# Patient Record
Sex: FEMALE | Employment: UNEMPLOYED | URBAN - METROPOLITAN AREA
[De-identification: names, ages, dates, MRNs, and addresses within clinical notes are randomized per-mention and may not be internally consistent; named-entity substitution may affect disease eponyms.]

---

## 2024-04-22 ENCOUNTER — LAB REQUISITION (OUTPATIENT)
Dept: LAB | Facility: CLINIC | Age: 34
End: 2024-04-22
Payer: COMMERCIAL

## 2024-04-22 DIAGNOSIS — Z13.29 ENCOUNTER FOR SCREENING FOR OTHER SUSPECTED ENDOCRINE DISORDER: ICD-10-CM

## 2024-04-22 PROCEDURE — 84443 ASSAY THYROID STIM HORMONE: CPT | Mod: ORL | Performed by: STUDENT IN AN ORGANIZED HEALTH CARE EDUCATION/TRAINING PROGRAM

## 2024-04-23 LAB — TSH SERPL DL<=0.005 MIU/L-ACNC: 1.79 UIU/ML (ref 0.3–4.2)

## 2024-05-08 ENCOUNTER — LAB REQUISITION (OUTPATIENT)
Dept: LAB | Facility: CLINIC | Age: 34
End: 2024-05-08
Payer: COMMERCIAL

## 2024-05-08 DIAGNOSIS — Z34.01 ENCOUNTER FOR SUPERVISION OF NORMAL FIRST PREGNANCY, FIRST TRIMESTER: ICD-10-CM

## 2024-05-08 LAB
BASOPHILS # BLD AUTO: 0 10E3/UL (ref 0–0.2)
BASOPHILS NFR BLD AUTO: 0 %
EOSINOPHIL # BLD AUTO: 0 10E3/UL (ref 0–0.7)
EOSINOPHIL NFR BLD AUTO: 0 %
ERYTHROCYTE [DISTWIDTH] IN BLOOD BY AUTOMATED COUNT: 12.8 % (ref 10–15)
HBA1C MFR BLD: 5.1 %
HBV SURFACE AG SERPL QL IA: NONREACTIVE
HCT VFR BLD AUTO: 41.6 % (ref 35–47)
HGB BLD-MCNC: 13.9 G/DL (ref 11.7–15.7)
IMM GRANULOCYTES # BLD: 0 10E3/UL
IMM GRANULOCYTES NFR BLD: 0 %
LYMPHOCYTES # BLD AUTO: 1.5 10E3/UL (ref 0.8–5.3)
LYMPHOCYTES NFR BLD AUTO: 19 %
MCH RBC QN AUTO: 29.9 PG (ref 26.5–33)
MCHC RBC AUTO-ENTMCNC: 33.4 G/DL (ref 31.5–36.5)
MCV RBC AUTO: 90 FL (ref 78–100)
MONOCYTES # BLD AUTO: 0.5 10E3/UL (ref 0–1.3)
MONOCYTES NFR BLD AUTO: 6 %
NEUTROPHILS # BLD AUTO: 5.7 10E3/UL (ref 1.6–8.3)
NEUTROPHILS NFR BLD AUTO: 75 %
NRBC # BLD AUTO: 0 10E3/UL
NRBC BLD AUTO-RTO: 0 /100
PLATELET # BLD AUTO: 266 10E3/UL (ref 150–450)
RBC # BLD AUTO: 4.65 10E6/UL (ref 3.8–5.2)
WBC # BLD AUTO: 7.8 10E3/UL (ref 4–11)

## 2024-05-08 PROCEDURE — 86803 HEPATITIS C AB TEST: CPT | Mod: ORL | Performed by: STUDENT IN AN ORGANIZED HEALTH CARE EDUCATION/TRAINING PROGRAM

## 2024-05-08 PROCEDURE — 87086 URINE CULTURE/COLONY COUNT: CPT | Mod: ORL | Performed by: STUDENT IN AN ORGANIZED HEALTH CARE EDUCATION/TRAINING PROGRAM

## 2024-05-08 PROCEDURE — 80081 OBSTETRIC PANEL INC HIV TSTG: CPT | Mod: ORL | Performed by: STUDENT IN AN ORGANIZED HEALTH CARE EDUCATION/TRAINING PROGRAM

## 2024-05-08 PROCEDURE — 83036 HEMOGLOBIN GLYCOSYLATED A1C: CPT | Mod: ORL | Performed by: STUDENT IN AN ORGANIZED HEALTH CARE EDUCATION/TRAINING PROGRAM

## 2024-05-09 LAB
ABO/RH(D): NORMAL
ANTIBODY SCREEN: NEGATIVE
HCV AB SERPL QL IA: NONREACTIVE
HIV 1+2 AB+HIV1 P24 AG SERPL QL IA: NONREACTIVE
RPR SER QL: NONREACTIVE
RUBV IGG SERPL QL IA: 29 INDEX
RUBV IGG SERPL QL IA: POSITIVE
SPECIMEN EXPIRATION DATE: NORMAL

## 2024-05-10 LAB — BACTERIA UR CULT: NO GROWTH

## 2024-06-19 ENCOUNTER — MEDICAL CORRESPONDENCE (OUTPATIENT)
Dept: HEALTH INFORMATION MANAGEMENT | Facility: CLINIC | Age: 34
End: 2024-06-19

## 2024-06-19 ENCOUNTER — LAB REQUISITION (OUTPATIENT)
Dept: LAB | Facility: CLINIC | Age: 34
End: 2024-06-19
Payer: COMMERCIAL

## 2024-06-19 ENCOUNTER — TRANSFERRED RECORDS (OUTPATIENT)
Dept: HEALTH INFORMATION MANAGEMENT | Facility: CLINIC | Age: 34
End: 2024-06-19

## 2024-06-19 DIAGNOSIS — Z13.79 ENCOUNTER FOR OTHER SCREENING FOR GENETIC AND CHROMOSOMAL ANOMALIES: ICD-10-CM

## 2024-06-19 DIAGNOSIS — E03.9 HYPOTHYROIDISM, UNSPECIFIED: ICD-10-CM

## 2024-06-19 PROCEDURE — 84443 ASSAY THYROID STIM HORMONE: CPT | Mod: ORL | Performed by: STUDENT IN AN ORGANIZED HEALTH CARE EDUCATION/TRAINING PROGRAM

## 2024-06-19 PROCEDURE — 82105 ALPHA-FETOPROTEIN SERUM: CPT | Mod: ORL | Performed by: STUDENT IN AN ORGANIZED HEALTH CARE EDUCATION/TRAINING PROGRAM

## 2024-06-20 ENCOUNTER — TRANSCRIBE ORDERS (OUTPATIENT)
Dept: MATERNAL FETAL MEDICINE | Facility: CLINIC | Age: 34
End: 2024-06-20
Payer: COMMERCIAL

## 2024-06-20 DIAGNOSIS — O26.90 PREGNANCY RELATED CONDITION, ANTEPARTUM: Primary | ICD-10-CM

## 2024-06-20 LAB — TSH SERPL DL<=0.005 MIU/L-ACNC: 3.14 UIU/ML (ref 0.3–4.2)

## 2024-06-21 LAB
# FETUSES US: NORMAL
AFP MOM SERPL: 1.4
AFP SERPL-MCNC: 64 NG/ML
AGE - REPORTED: 32.7 YR
CURRENT SMOKER: NO
FAMILY MEMBER DISEASES HX: NO
GA METHOD: NORMAL
GA: NORMAL WK
IDDM PATIENT QL: NO
INTEGRATED SCN PATIENT-IMP: NORMAL
SPECIMEN DRAWN SERPL: NORMAL

## 2024-07-09 ENCOUNTER — PRE VISIT (OUTPATIENT)
Dept: MATERNAL FETAL MEDICINE | Facility: CLINIC | Age: 34
End: 2024-07-09
Payer: COMMERCIAL

## 2024-07-16 ENCOUNTER — OFFICE VISIT (OUTPATIENT)
Dept: MATERNAL FETAL MEDICINE | Facility: CLINIC | Age: 34
End: 2024-07-16
Attending: STUDENT IN AN ORGANIZED HEALTH CARE EDUCATION/TRAINING PROGRAM
Payer: COMMERCIAL

## 2024-07-16 ENCOUNTER — HOSPITAL ENCOUNTER (OUTPATIENT)
Dept: ULTRASOUND IMAGING | Facility: CLINIC | Age: 34
Discharge: HOME | End: 2024-07-16
Attending: STUDENT IN AN ORGANIZED HEALTH CARE EDUCATION/TRAINING PROGRAM
Payer: COMMERCIAL

## 2024-07-16 DIAGNOSIS — O26.90 PREGNANCY RELATED CONDITION, ANTEPARTUM: ICD-10-CM

## 2024-07-16 DIAGNOSIS — O09.812 PREGNANCY RESULTING FROM IN VITRO FERTILIZATION IN SECOND TRIMESTER: Primary | ICD-10-CM

## 2024-07-16 DIAGNOSIS — Z36.89 ENCOUNTER FOR FETAL ANATOMIC SURVEY: ICD-10-CM

## 2024-07-16 PROCEDURE — 99213 OFFICE O/P EST LOW 20 MIN: CPT | Mod: 25 | Performed by: STUDENT IN AN ORGANIZED HEALTH CARE EDUCATION/TRAINING PROGRAM

## 2024-07-16 PROCEDURE — 76811 OB US DETAILED SNGL FETUS: CPT

## 2024-07-16 PROCEDURE — 76811 OB US DETAILED SNGL FETUS: CPT | Mod: 26 | Performed by: STUDENT IN AN ORGANIZED HEALTH CARE EDUCATION/TRAINING PROGRAM

## 2024-07-16 NOTE — NURSING NOTE
Patient presents to [facility] for L2 at 21w4d due to IVF. Denies LOF, vaginal bleeding, cramping/contractions. SBAR given to SELWYN MD, see their note in Epic.

## 2024-07-16 NOTE — PROGRESS NOTES
The patient was seen for an ultrasound in the [facility] today.  For a detailed report of the ultrasound examination, please see the ultrasound report which can be found under the imaging tab.     If you have questions regarding today's evaluation or if we can be of further service, please contact the [facility].      Gina Downey MD  Maternal Fetal Medicine

## 2024-07-29 ENCOUNTER — OFFICE VISIT (OUTPATIENT)
Dept: MATERNAL FETAL MEDICINE | Facility: CLINIC | Age: 34
End: 2024-07-29
Attending: STUDENT IN AN ORGANIZED HEALTH CARE EDUCATION/TRAINING PROGRAM
Payer: COMMERCIAL

## 2024-07-29 ENCOUNTER — HOSPITAL ENCOUNTER (OUTPATIENT)
Dept: ULTRASOUND IMAGING | Facility: CLINIC | Age: 34
Discharge: HOME | End: 2024-07-29
Attending: STUDENT IN AN ORGANIZED HEALTH CARE EDUCATION/TRAINING PROGRAM
Payer: COMMERCIAL

## 2024-07-29 DIAGNOSIS — O26.90 PREGNANCY RELATED CONDITION, ANTEPARTUM: ICD-10-CM

## 2024-07-29 DIAGNOSIS — O09.812 PREGNANCY RESULTING FROM IN VITRO FERTILIZATION IN SECOND TRIMESTER: Primary | ICD-10-CM

## 2024-07-29 PROCEDURE — 76827 ECHO EXAM OF FETAL HEART: CPT | Mod: 26 | Performed by: STUDENT IN AN ORGANIZED HEALTH CARE EDUCATION/TRAINING PROGRAM

## 2024-07-29 PROCEDURE — 99213 OFFICE O/P EST LOW 20 MIN: CPT | Mod: 25 | Performed by: STUDENT IN AN ORGANIZED HEALTH CARE EDUCATION/TRAINING PROGRAM

## 2024-07-29 PROCEDURE — 93325 DOPPLER ECHO COLOR FLOW MAPG: CPT

## 2024-07-29 PROCEDURE — 93325 DOPPLER ECHO COLOR FLOW MAPG: CPT | Mod: 26 | Performed by: STUDENT IN AN ORGANIZED HEALTH CARE EDUCATION/TRAINING PROGRAM

## 2024-07-29 PROCEDURE — 76827 ECHO EXAM OF FETAL HEART: CPT

## 2024-07-29 PROCEDURE — 76825 ECHO EXAM OF FETAL HEART: CPT | Mod: 26 | Performed by: STUDENT IN AN ORGANIZED HEALTH CARE EDUCATION/TRAINING PROGRAM

## 2024-07-29 NOTE — PROGRESS NOTES
"Please see \"Imaging\" tab under \"Chart Review\" for details of today's visit.    Meghan Downey    "

## 2024-09-04 ENCOUNTER — LAB REQUISITION (OUTPATIENT)
Dept: LAB | Facility: CLINIC | Age: 34
End: 2024-09-04
Payer: COMMERCIAL

## 2024-09-04 DIAGNOSIS — Z36.89 ENCOUNTER FOR OTHER SPECIFIED ANTENATAL SCREENING: ICD-10-CM

## 2024-09-04 LAB
ERYTHROCYTE [DISTWIDTH] IN BLOOD BY AUTOMATED COUNT: 12.9 % (ref 10–15)
HCT VFR BLD AUTO: 36.8 % (ref 35–47)
HGB BLD-MCNC: 12.1 G/DL (ref 11.7–15.7)
MCH RBC QN AUTO: 30.6 PG (ref 26.5–33)
MCHC RBC AUTO-ENTMCNC: 32.9 G/DL (ref 31.5–36.5)
MCV RBC AUTO: 93 FL (ref 78–100)
PLATELET # BLD AUTO: 210 10E3/UL (ref 150–450)
RBC # BLD AUTO: 3.96 10E6/UL (ref 3.8–5.2)
WBC # BLD AUTO: 12.4 10E3/UL (ref 4–11)

## 2024-09-04 PROCEDURE — 85027 COMPLETE CBC AUTOMATED: CPT | Mod: ORL | Performed by: STUDENT IN AN ORGANIZED HEALTH CARE EDUCATION/TRAINING PROGRAM

## 2024-09-04 PROCEDURE — 86780 TREPONEMA PALLIDUM: CPT | Mod: ORL | Performed by: STUDENT IN AN ORGANIZED HEALTH CARE EDUCATION/TRAINING PROGRAM

## 2024-09-05 LAB — T PALLIDUM AB SER QL: NONREACTIVE

## 2024-09-18 ENCOUNTER — LAB REQUISITION (OUTPATIENT)
Dept: LAB | Facility: CLINIC | Age: 34
End: 2024-09-18
Payer: COMMERCIAL

## 2024-09-18 DIAGNOSIS — E03.9 HYPOTHYROIDISM, UNSPECIFIED: ICD-10-CM

## 2024-09-18 PROCEDURE — 84443 ASSAY THYROID STIM HORMONE: CPT | Mod: ORL | Performed by: STUDENT IN AN ORGANIZED HEALTH CARE EDUCATION/TRAINING PROGRAM

## 2024-09-19 LAB — TSH SERPL DL<=0.005 MIU/L-ACNC: 2.55 UIU/ML (ref 0.3–4.2)

## 2024-10-06 ENCOUNTER — HOSPITAL ENCOUNTER (INPATIENT)
Facility: CLINIC | Age: 34
LOS: 5 days | Discharge: HOME | End: 2024-10-11
Attending: OBSTETRICS & GYNECOLOGY | Admitting: OBSTETRICS & GYNECOLOGY
Payer: COMMERCIAL

## 2024-10-06 LAB
ABO/RH(D): NORMAL
ALBUMIN UR-MCNC: NEGATIVE MG/DL
AMPHETAMINES UR QL SCN: NORMAL
ANTIBODY SCREEN: NEGATIVE
APPEARANCE UR: CLEAR
BARBITURATES UR QL SCN: NORMAL
BASOPHILS # BLD AUTO: 0 10E3/UL (ref 0–0.2)
BASOPHILS NFR BLD AUTO: 0 %
BENZODIAZ UR QL SCN: NORMAL
BILIRUB UR QL STRIP: NEGATIVE
BZE UR QL SCN: NORMAL
CANNABINOIDS UR QL SCN: NORMAL
CLUE CELLS: ABNORMAL
COLOR UR AUTO: NORMAL
EOSINOPHIL # BLD AUTO: 0 10E3/UL (ref 0–0.7)
EOSINOPHIL NFR BLD AUTO: 0 %
ERYTHROCYTE [DISTWIDTH] IN BLOOD BY AUTOMATED COUNT: 12.1 % (ref 10–15)
FENTANYL UR QL: NORMAL
GLUCOSE UR STRIP-MCNC: NEGATIVE MG/DL
HCT VFR BLD AUTO: 38.2 % (ref 35–47)
HGB BLD-MCNC: 12.7 G/DL (ref 11.7–15.7)
HGB UR QL STRIP: NEGATIVE
HOLD SPECIMEN: NORMAL
IMM GRANULOCYTES # BLD: 0.1 10E3/UL
IMM GRANULOCYTES NFR BLD: 1 %
KETONES UR STRIP-MCNC: NEGATIVE MG/DL
LEUKOCYTE ESTERASE UR QL STRIP: NEGATIVE
LYMPHOCYTES # BLD AUTO: 1.5 10E3/UL (ref 0.8–5.3)
LYMPHOCYTES NFR BLD AUTO: 11 %
MCH RBC QN AUTO: 29.9 PG (ref 26.5–33)
MCHC RBC AUTO-ENTMCNC: 33.2 G/DL (ref 31.5–36.5)
MCV RBC AUTO: 90 FL (ref 78–100)
MONOCYTES # BLD AUTO: 0.9 10E3/UL (ref 0–1.3)
MONOCYTES NFR BLD AUTO: 6 %
NEUTROPHILS # BLD AUTO: 11.3 10E3/UL (ref 1.6–8.3)
NEUTROPHILS NFR BLD AUTO: 82 %
NITRATE UR QL: NEGATIVE
NRBC # BLD AUTO: 0 10E3/UL
NRBC BLD AUTO-RTO: 0 /100
OPIATES UR QL SCN: NORMAL
PCP QUAL URINE (ROCHE): NORMAL
PH UR STRIP: 6.5 [PH] (ref 5–7)
PLATELET # BLD AUTO: 189 10E3/UL (ref 150–450)
RBC # BLD AUTO: 4.25 10E6/UL (ref 3.8–5.2)
RUPTURE OF FETAL MEMBRANES BY ROM PLUS: POSITIVE
SP GR UR STRIP: 1 (ref 1–1.03)
SPECIMEN EXPIRATION DATE: NORMAL
TRICHOMONAS, WET PREP: ABNORMAL
UROBILINOGEN UR STRIP-MCNC: NORMAL MG/DL
WBC # BLD AUTO: 13.9 10E3/UL (ref 4–11)
WBC'S/HIGH POWER FIELD, WET PREP: ABNORMAL
YEAST, WET PREP: ABNORMAL

## 2024-10-06 PROCEDURE — 80307 DRUG TEST PRSMV CHEM ANLYZR: CPT | Performed by: OBSTETRICS & GYNECOLOGY

## 2024-10-06 PROCEDURE — 86923 COMPATIBILITY TEST ELECTRIC: CPT | Performed by: OBSTETRICS & GYNECOLOGY

## 2024-10-06 PROCEDURE — 87210 SMEAR WET MOUNT SALINE/INK: CPT | Performed by: OBSTETRICS & GYNECOLOGY

## 2024-10-06 PROCEDURE — 86923 COMPATIBILITY TEST ELECTRIC: CPT | Performed by: ANESTHESIOLOGY

## 2024-10-06 PROCEDURE — 87653 STREP B DNA AMP PROBE: CPT | Performed by: OBSTETRICS & GYNECOLOGY

## 2024-10-06 PROCEDURE — G0463 HOSPITAL OUTPT CLINIC VISIT: HCPCS

## 2024-10-06 PROCEDURE — 86900 BLOOD TYPING SEROLOGIC ABO: CPT | Performed by: OBSTETRICS & GYNECOLOGY

## 2024-10-06 PROCEDURE — 258N000003 HC RX IP 258 OP 636: Performed by: OBSTETRICS & GYNECOLOGY

## 2024-10-06 PROCEDURE — 250N000011 HC RX IP 250 OP 636: Performed by: OBSTETRICS & GYNECOLOGY

## 2024-10-06 PROCEDURE — 86901 BLOOD TYPING SEROLOGIC RH(D): CPT | Performed by: OBSTETRICS & GYNECOLOGY

## 2024-10-06 PROCEDURE — 120N000001 HC R&B MED SURG/OB

## 2024-10-06 PROCEDURE — 86923 COMPATIBILITY TEST ELECTRIC: CPT

## 2024-10-06 PROCEDURE — 36415 COLL VENOUS BLD VENIPUNCTURE: CPT | Performed by: OBSTETRICS & GYNECOLOGY

## 2024-10-06 PROCEDURE — 85025 COMPLETE CBC W/AUTO DIFF WBC: CPT | Performed by: OBSTETRICS & GYNECOLOGY

## 2024-10-06 PROCEDURE — 86780 TREPONEMA PALLIDUM: CPT | Performed by: OBSTETRICS & GYNECOLOGY

## 2024-10-06 PROCEDURE — 84112 EVAL AMNIOTIC FLUID PROTEIN: CPT | Performed by: OBSTETRICS & GYNECOLOGY

## 2024-10-06 PROCEDURE — 250N000013 HC RX MED GY IP 250 OP 250 PS 637: Performed by: OBSTETRICS & GYNECOLOGY

## 2024-10-06 PROCEDURE — 81003 URINALYSIS AUTO W/O SCOPE: CPT | Performed by: OBSTETRICS & GYNECOLOGY

## 2024-10-06 RX ORDER — BETAMETHASONE SODIUM PHOSPHATE AND BETAMETHASONE ACETATE 3; 3 MG/ML; MG/ML
12 INJECTION, SUSPENSION INTRA-ARTICULAR; INTRALESIONAL; INTRAMUSCULAR; SOFT TISSUE EVERY 24 HOURS
Status: COMPLETED | OUTPATIENT
Start: 2024-10-06 | End: 2024-10-07

## 2024-10-06 RX ORDER — PRENATAL VIT/IRON FUM/FOLIC AC 27MG-0.8MG
1 TABLET ORAL DAILY
Status: DISCONTINUED | OUTPATIENT
Start: 2024-10-07 | End: 2024-10-11 | Stop reason: HOSPADM

## 2024-10-06 RX ORDER — HYDROXYZINE HYDROCHLORIDE 25 MG/1
50 TABLET, FILM COATED ORAL
Status: DISCONTINUED | OUTPATIENT
Start: 2024-10-06 | End: 2024-10-11 | Stop reason: HOSPADM

## 2024-10-06 RX ORDER — DULOXETIN HYDROCHLORIDE 60 MG/1
120 CAPSULE, DELAYED RELEASE ORAL DAILY
COMMUNITY

## 2024-10-06 RX ORDER — ASPIRIN 81 MG/1
81 TABLET, CHEWABLE ORAL DAILY
Status: ON HOLD | COMMUNITY
End: 2024-10-11

## 2024-10-06 RX ORDER — DULOXETIN HYDROCHLORIDE 60 MG/1
120 CAPSULE, DELAYED RELEASE ORAL DAILY
Status: DISCONTINUED | OUTPATIENT
Start: 2024-10-07 | End: 2024-10-11 | Stop reason: HOSPADM

## 2024-10-06 RX ORDER — AZITHROMYCIN 250 MG/1
1000 TABLET, FILM COATED ORAL ONCE
Status: COMPLETED | OUTPATIENT
Start: 2024-10-06 | End: 2024-10-06

## 2024-10-06 RX ORDER — METOCLOPRAMIDE 10 MG/1
10 TABLET ORAL
Status: DISCONTINUED | OUTPATIENT
Start: 2024-10-06 | End: 2024-10-11 | Stop reason: HOSPADM

## 2024-10-06 RX ORDER — AMOXICILLIN 250 MG/1
250 CAPSULE ORAL 3 TIMES DAILY
Status: DISCONTINUED | OUTPATIENT
Start: 2024-10-08 | End: 2024-10-08

## 2024-10-06 RX ORDER — LEVOTHYROXINE SODIUM 100 UG/1
100 TABLET ORAL DAILY
Status: DISCONTINUED | OUTPATIENT
Start: 2024-10-07 | End: 2024-10-11 | Stop reason: HOSPADM

## 2024-10-06 RX ORDER — DOCUSATE SODIUM 100 MG/1
100 CAPSULE, LIQUID FILLED ORAL 2 TIMES DAILY
Status: DISCONTINUED | OUTPATIENT
Start: 2024-10-06 | End: 2024-10-08

## 2024-10-06 RX ORDER — AMPICILLIN 2 G/1
2 INJECTION, POWDER, FOR SOLUTION INTRAVENOUS EVERY 6 HOURS
Status: DISCONTINUED | OUTPATIENT
Start: 2024-10-06 | End: 2024-10-08

## 2024-10-06 RX ORDER — NIFEDIPINE 10 MG/1
20 CAPSULE ORAL EVERY 6 HOURS
Status: DISCONTINUED | OUTPATIENT
Start: 2024-10-07 | End: 2024-10-07

## 2024-10-06 RX ORDER — LEVOTHYROXINE SODIUM 100 UG/1
100 TABLET ORAL DAILY
COMMUNITY

## 2024-10-06 RX ORDER — FAMOTIDINE 10 MG
10 TABLET ORAL 2 TIMES DAILY
Status: DISCONTINUED | OUTPATIENT
Start: 2024-10-06 | End: 2024-10-11 | Stop reason: HOSPADM

## 2024-10-06 RX ORDER — VITAMIN A, VITAMIN C, VITAMIN D-3, VITAMIN E, VITAMIN B-1, VITAMIN B-2, NIACIN, VITAMIN B-6, CALCIUM, IRON, ZINC, COPPER 4000; 120; 400; 22; 1.84; 3; 20; 10; 1; 12; 200; 27; 25; 2 [IU]/1; MG/1; [IU]/1; MG/1; MG/1; MG/1; MG/1; MG/1; MG/1; UG/1; MG/1; MG/1; MG/1; MG/1
1 TABLET ORAL DAILY
COMMUNITY

## 2024-10-06 RX ORDER — MONTELUKAST SODIUM 10 MG/1
10 TABLET ORAL AT BEDTIME
Status: DISCONTINUED | OUTPATIENT
Start: 2024-10-06 | End: 2024-10-11 | Stop reason: HOSPADM

## 2024-10-06 RX ORDER — SODIUM CHLORIDE, SODIUM LACTATE, POTASSIUM CHLORIDE, CALCIUM CHLORIDE 600; 310; 30; 20 MG/100ML; MG/100ML; MG/100ML; MG/100ML
INJECTION, SOLUTION INTRAVENOUS CONTINUOUS
Status: DISCONTINUED | OUTPATIENT
Start: 2024-10-06 | End: 2024-10-07

## 2024-10-06 RX ORDER — NIFEDIPINE 10 MG/1
20 CAPSULE ORAL ONCE
Status: COMPLETED | OUTPATIENT
Start: 2024-10-06 | End: 2024-10-06

## 2024-10-06 RX ADMIN — SODIUM CHLORIDE, POTASSIUM CHLORIDE, SODIUM LACTATE AND CALCIUM CHLORIDE 500 ML: 600; 310; 30; 20 INJECTION, SOLUTION INTRAVENOUS at 18:26

## 2024-10-06 RX ADMIN — METOCLOPRAMIDE 10 MG: 10 TABLET ORAL at 20:20

## 2024-10-06 RX ADMIN — SODIUM CHLORIDE, POTASSIUM CHLORIDE, SODIUM LACTATE AND CALCIUM CHLORIDE: 600; 310; 30; 20 INJECTION, SOLUTION INTRAVENOUS at 23:27

## 2024-10-06 RX ADMIN — BETAMETHASONE ACETATE AND BETAMETHASONE SODIUM PHOSPHATE 12 MG: 3; 3 INJECTION, SUSPENSION INTRA-ARTICULAR; INTRALESIONAL; INTRAMUSCULAR; SOFT TISSUE at 18:49

## 2024-10-06 RX ADMIN — NIFEDIPINE 20 MG: 10 CAPSULE ORAL at 23:56

## 2024-10-06 RX ADMIN — AZITHROMYCIN DIHYDRATE 1000 MG: 250 TABLET ORAL at 17:44

## 2024-10-06 RX ADMIN — HYDROXYZINE HYDROCHLORIDE 50 MG: 25 TABLET, FILM COATED ORAL at 20:45

## 2024-10-06 RX ADMIN — NIFEDIPINE 20 MG: 10 CAPSULE ORAL at 17:43

## 2024-10-06 RX ADMIN — DOCUSATE SODIUM 100 MG: 100 CAPSULE, LIQUID FILLED ORAL at 20:46

## 2024-10-06 RX ADMIN — FAMOTIDINE 10 MG: 10 TABLET, FILM COATED ORAL at 20:46

## 2024-10-06 RX ADMIN — AMPICILLIN SODIUM 2 G: 2 INJECTION, POWDER, FOR SOLUTION INTRAMUSCULAR; INTRAVENOUS at 18:33

## 2024-10-06 RX ADMIN — MONTELUKAST 10 MG: 10 TABLET, FILM COATED ORAL at 21:25

## 2024-10-06 ASSESSMENT — ACTIVITIES OF DAILY LIVING (ADL)
ADLS_ACUITY_SCORE: 18
ADLS_ACUITY_SCORE: 18
ADLS_ACUITY_SCORE: 35
ADLS_ACUITY_SCORE: 18

## 2024-10-06 NOTE — H&P
CC: Leakage of fluid    HPI: 33yo  at 33w2d with EDC 24 presents with leakage of fluid since  today.  She denies contractions, vaginal bleeding, or decreased fetal movements.  She was seen two days ago for her routine OB follow up and noted a small tinge of blood.  She was 1cm and no further complaints.  Yesterday she called in with some brown discharge but no bright red bleeding.  This continued until today when she had a large gush of fluid without warning.  She continues to deny contractions or recent illness.    PNI:  IVF pregnancy - ASA 81mg, Lvl 2 US WNL, fECHO WNL  Anxiety - Duloxetine  Hypothyroidism - Synthroid 100mcg  Asthma    PNL: B+, Rubella immune, GBS pending, msAPF WNL, otherwise WNL    PObHx: Primigravida  PGynHx: Reg menses, no STDs, Infertility  PMHx: Asthma, Acne, Anxiety, Allergic rhinitis, IBS, Fibromyalgia, Migraines  PSHx: Septoplasty of deviated septum of nose, Lasix  Meds: PNV, ASA 81mg, Duloxetine, Synthroid  Allergies: Macrobid, NSAIDs (Rash)  PSocHx: No Tobacco, alcohol, drugs   PFamHx: Non-contributory     PE: VSS, afebrile  NAD, comfortable, RRR, CTAB, Abd soft NTND, fundus nontender  SVE /-1 per admitting nurse with grossly ruptured clear fluid  Fetal Cat 1   [place] q4m  I performed BSUS with cephalic presentation, subjectively low fluid    Labs:   ROM+ positive  WBC 13.9  Hb 12.7  Plt 189  GBS pending  Wet prep neg  UA WNL  UDS neg    Assessment: 33yo  at 33w2d with  premature rupture of membranes.   Plan: Admit.  PPROM - BMZ x1 (10/6 1730), second dose in 24 hours   - Latency Abx Day  with Azithro/Amp   - Tocolysis for 48 hours with nifedipine   - Hold on Neuromag unless changes cervix   - GBS pending   - Plan for MFM consult tomorrow, order placed   - NICU consult   - Type and Screen pending  Anxiety - Duloxetine  Hypothyroid - Synthroid  Asthma - Stable, no meds

## 2024-10-06 NOTE — PROGRESS NOTES
Pt arrived to triage with spouse for rule out rupture of membranes, 33+2. With consent, fetal monitors placed on maternal abdomen. VSS, afebrile. Pt states she felt a gush of fluid at 1530 today. Dr Loyd notified of patient arrival. ROM+ collected and sent with consent and per orders. ROM+ positive. Dr Loyd notified. FHTs category 1. Per orders, GBS, wet prep collected and sent to lab. UA sent to lab. Labs drawn. NNP consult entered. Report to Ngozi PAYNE to assume cares. Pt and spouse in agreement with plan of care. All questions and concerns addressed. Dr Loyd in department to talk with pt and spouse. Cont to monitor and assess.

## 2024-10-06 NOTE — PROVIDER NOTIFICATION
10/06/24 9440   Provider Notification   Provider Name/Title Evert   Method of Notification At Bedside   Request Evaluate in Person   Notification Reason Other (Comment)     Performed bedside ultrasound to verify baby is head down.     Spoke with patient and  about plan of care.     Dr Loyd aware that patient is rickie but not feeling them. Okay overnight with patient rickie as long as patient is not feeling them, if patient becomes more uncomfortable from contractions Dr Loyd wants to be updated overnight.

## 2024-10-07 ENCOUNTER — ANESTHESIA (OUTPATIENT)
Dept: OBGYN | Facility: CLINIC | Age: 34
End: 2024-10-07
Payer: COMMERCIAL

## 2024-10-07 ENCOUNTER — ANESTHESIA EVENT (OUTPATIENT)
Dept: OBGYN | Facility: CLINIC | Age: 34
End: 2024-10-07
Payer: COMMERCIAL

## 2024-10-07 LAB
ERYTHROCYTE [DISTWIDTH] IN BLOOD BY AUTOMATED COUNT: 11.9 % (ref 10–15)
GP B STREP DNA SPEC QL NAA+PROBE: NEGATIVE
HCT VFR BLD AUTO: 32.6 % (ref 35–47)
HGB BLD-MCNC: 11 G/DL (ref 11.7–15.7)
MCH RBC QN AUTO: 30.4 PG (ref 26.5–33)
MCHC RBC AUTO-ENTMCNC: 33.7 G/DL (ref 31.5–36.5)
MCV RBC AUTO: 90 FL (ref 78–100)
PLATELET # BLD AUTO: 181 10E3/UL (ref 150–450)
RBC # BLD AUTO: 3.62 10E6/UL (ref 3.8–5.2)
T PALLIDUM AB SER QL: NONREACTIVE
WBC # BLD AUTO: 18.2 10E3/UL (ref 4–11)

## 2024-10-07 PROCEDURE — 250N000011 HC RX IP 250 OP 636: Performed by: STUDENT IN AN ORGANIZED HEALTH CARE EDUCATION/TRAINING PROGRAM

## 2024-10-07 PROCEDURE — 85014 HEMATOCRIT: CPT | Performed by: OBSTETRICS & GYNECOLOGY

## 2024-10-07 PROCEDURE — 120N000001 HC R&B MED SURG/OB

## 2024-10-07 PROCEDURE — 59400 OBSTETRICAL CARE: CPT | Performed by: STUDENT IN AN ORGANIZED HEALTH CARE EDUCATION/TRAINING PROGRAM

## 2024-10-07 PROCEDURE — 370N000003 HC ANESTHESIA WARD SERVICE: Performed by: STUDENT IN AN ORGANIZED HEALTH CARE EDUCATION/TRAINING PROGRAM

## 2024-10-07 PROCEDURE — 36415 COLL VENOUS BLD VENIPUNCTURE: CPT | Performed by: OBSTETRICS & GYNECOLOGY

## 2024-10-07 PROCEDURE — 00HU33Z INSERTION OF INFUSION DEVICE INTO SPINAL CANAL, PERCUTANEOUS APPROACH: ICD-10-PCS | Performed by: STUDENT IN AN ORGANIZED HEALTH CARE EDUCATION/TRAINING PROGRAM

## 2024-10-07 PROCEDURE — 250N000009 HC RX 250: Performed by: STUDENT IN AN ORGANIZED HEALTH CARE EDUCATION/TRAINING PROGRAM

## 2024-10-07 PROCEDURE — 250N000011 HC RX IP 250 OP 636: Performed by: OBSTETRICS & GYNECOLOGY

## 2024-10-07 PROCEDURE — 250N000013 HC RX MED GY IP 250 OP 250 PS 637: Performed by: OBSTETRICS & GYNECOLOGY

## 2024-10-07 PROCEDURE — 258N000003 HC RX IP 258 OP 636: Performed by: OBSTETRICS & GYNECOLOGY

## 2024-10-07 PROCEDURE — 99221 1ST HOSP IP/OBS SF/LOW 40: CPT | Performed by: NURSE PRACTITIONER

## 2024-10-07 PROCEDURE — 3E0R3BZ INTRODUCTION OF ANESTHETIC AGENT INTO SPINAL CANAL, PERCUTANEOUS APPROACH: ICD-10-PCS | Performed by: STUDENT IN AN ORGANIZED HEALTH CARE EDUCATION/TRAINING PROGRAM

## 2024-10-07 RX ORDER — ROPIVACAINE HYDROCHLORIDE 2 MG/ML
10 INJECTION, SOLUTION EPIDURAL; INFILTRATION; PERINEURAL ONCE
Status: DISCONTINUED | OUTPATIENT
Start: 2024-10-07 | End: 2024-10-08 | Stop reason: HOSPADM

## 2024-10-07 RX ORDER — FENTANYL CITRATE 50 UG/ML
100 INJECTION, SOLUTION INTRAMUSCULAR; INTRAVENOUS
Status: DISCONTINUED | OUTPATIENT
Start: 2024-10-07 | End: 2024-10-08 | Stop reason: HOSPADM

## 2024-10-07 RX ORDER — MISOPROSTOL 200 UG/1
400 TABLET ORAL
Status: DISCONTINUED | OUTPATIENT
Start: 2024-10-07 | End: 2024-10-08 | Stop reason: HOSPADM

## 2024-10-07 RX ORDER — NALOXONE HYDROCHLORIDE 0.4 MG/ML
0.2 INJECTION, SOLUTION INTRAMUSCULAR; INTRAVENOUS; SUBCUTANEOUS
Status: DISCONTINUED | OUTPATIENT
Start: 2024-10-07 | End: 2024-10-08 | Stop reason: HOSPADM

## 2024-10-07 RX ORDER — SODIUM CHLORIDE, SODIUM LACTATE, POTASSIUM CHLORIDE, CALCIUM CHLORIDE 600; 310; 30; 20 MG/100ML; MG/100ML; MG/100ML; MG/100ML
INJECTION, SOLUTION INTRAVENOUS CONTINUOUS
Status: DISCONTINUED | OUTPATIENT
Start: 2024-10-07 | End: 2024-10-08

## 2024-10-07 RX ORDER — LOPERAMIDE HYDROCHLORIDE 2 MG/1
4 CAPSULE ORAL
Status: DISCONTINUED | OUTPATIENT
Start: 2024-10-07 | End: 2024-10-08 | Stop reason: HOSPADM

## 2024-10-07 RX ORDER — OXYTOCIN/0.9 % SODIUM CHLORIDE 30/500 ML
100-340 PLASTIC BAG, INJECTION (ML) INTRAVENOUS CONTINUOUS PRN
Status: DISCONTINUED | OUTPATIENT
Start: 2024-10-07 | End: 2024-10-10

## 2024-10-07 RX ORDER — ONDANSETRON 4 MG/1
4 TABLET, ORALLY DISINTEGRATING ORAL EVERY 6 HOURS PRN
Status: DISCONTINUED | OUTPATIENT
Start: 2024-10-07 | End: 2024-10-08 | Stop reason: HOSPADM

## 2024-10-07 RX ORDER — OXYTOCIN 10 [USP'U]/ML
10 INJECTION, SOLUTION INTRAMUSCULAR; INTRAVENOUS
Status: DISCONTINUED | OUTPATIENT
Start: 2024-10-07 | End: 2024-10-08 | Stop reason: HOSPADM

## 2024-10-07 RX ORDER — ONDANSETRON 2 MG/ML
4 INJECTION INTRAMUSCULAR; INTRAVENOUS EVERY 6 HOURS PRN
Status: DISCONTINUED | OUTPATIENT
Start: 2024-10-07 | End: 2024-10-08 | Stop reason: HOSPADM

## 2024-10-07 RX ORDER — ROPIVACAINE HYDROCHLORIDE 2 MG/ML
INJECTION, SOLUTION EPIDURAL; INFILTRATION; PERINEURAL
Status: COMPLETED | OUTPATIENT
Start: 2024-10-07 | End: 2024-10-07

## 2024-10-07 RX ORDER — CALCIUM GLUCONATE 94 MG/ML
1 INJECTION, SOLUTION INTRAVENOUS
Status: DISCONTINUED | OUTPATIENT
Start: 2024-10-07 | End: 2024-10-08 | Stop reason: HOSPADM

## 2024-10-07 RX ORDER — OXYTOCIN/0.9 % SODIUM CHLORIDE 30/500 ML
340 PLASTIC BAG, INJECTION (ML) INTRAVENOUS CONTINUOUS PRN
Status: DISCONTINUED | OUTPATIENT
Start: 2024-10-07 | End: 2024-10-08 | Stop reason: HOSPADM

## 2024-10-07 RX ORDER — MISOPROSTOL 200 UG/1
800 TABLET ORAL
Status: DISCONTINUED | OUTPATIENT
Start: 2024-10-07 | End: 2024-10-08 | Stop reason: HOSPADM

## 2024-10-07 RX ORDER — LOPERAMIDE HYDROCHLORIDE 2 MG/1
2 CAPSULE ORAL
Status: DISCONTINUED | OUTPATIENT
Start: 2024-10-07 | End: 2024-10-08 | Stop reason: HOSPADM

## 2024-10-07 RX ORDER — EPHEDRINE SULFATE 50 MG/ML
5 INJECTION, SOLUTION INTRAMUSCULAR; INTRAVENOUS; SUBCUTANEOUS ONCE
Status: COMPLETED | OUTPATIENT
Start: 2024-10-07 | End: 2024-10-07

## 2024-10-07 RX ORDER — NALOXONE HYDROCHLORIDE 0.4 MG/ML
0.4 INJECTION, SOLUTION INTRAMUSCULAR; INTRAVENOUS; SUBCUTANEOUS
Status: DISCONTINUED | OUTPATIENT
Start: 2024-10-07 | End: 2024-10-08 | Stop reason: HOSPADM

## 2024-10-07 RX ORDER — OXYTOCIN 10 [USP'U]/ML
10 INJECTION, SOLUTION INTRAMUSCULAR; INTRAVENOUS
Status: DISCONTINUED | OUTPATIENT
Start: 2024-10-07 | End: 2024-10-10

## 2024-10-07 RX ORDER — FENTANYL CITRATE-0.9 % NACL/PF 10 MCG/ML
100 PLASTIC BAG, INJECTION (ML) INTRAVENOUS EVERY 5 MIN PRN
Status: COMPLETED | OUTPATIENT
Start: 2024-10-07 | End: 2024-10-07

## 2024-10-07 RX ORDER — ACETAMINOPHEN 325 MG/1
650 TABLET ORAL EVERY 4 HOURS PRN
Status: DISCONTINUED | OUTPATIENT
Start: 2024-10-07 | End: 2024-10-08 | Stop reason: HOSPADM

## 2024-10-07 RX ORDER — METHYLERGONOVINE MALEATE 0.2 MG/ML
200 INJECTION INTRAVENOUS
Status: DISCONTINUED | OUTPATIENT
Start: 2024-10-07 | End: 2024-10-08 | Stop reason: HOSPADM

## 2024-10-07 RX ORDER — METOCLOPRAMIDE HYDROCHLORIDE 5 MG/ML
10 INJECTION INTRAMUSCULAR; INTRAVENOUS EVERY 6 HOURS PRN
Status: DISCONTINUED | OUTPATIENT
Start: 2024-10-07 | End: 2024-10-08 | Stop reason: HOSPADM

## 2024-10-07 RX ORDER — NALBUPHINE HYDROCHLORIDE 10 MG/ML
2.5-5 INJECTION INTRAMUSCULAR; INTRAVENOUS; SUBCUTANEOUS EVERY 6 HOURS PRN
Status: DISCONTINUED | OUTPATIENT
Start: 2024-10-07 | End: 2024-10-11 | Stop reason: HOSPADM

## 2024-10-07 RX ORDER — CITRIC ACID/SODIUM CITRATE 334-500MG
30 SOLUTION, ORAL ORAL
Status: DISCONTINUED | OUTPATIENT
Start: 2024-10-07 | End: 2024-10-08 | Stop reason: HOSPADM

## 2024-10-07 RX ORDER — PROCHLORPERAZINE MALEATE 5 MG/1
10 TABLET ORAL EVERY 6 HOURS PRN
Status: DISCONTINUED | OUTPATIENT
Start: 2024-10-07 | End: 2024-10-08 | Stop reason: HOSPADM

## 2024-10-07 RX ORDER — TRANEXAMIC ACID 10 MG/ML
1 INJECTION, SOLUTION INTRAVENOUS EVERY 30 MIN PRN
Status: DISCONTINUED | OUTPATIENT
Start: 2024-10-07 | End: 2024-10-08 | Stop reason: HOSPADM

## 2024-10-07 RX ORDER — CARBOPROST TROMETHAMINE 250 UG/ML
250 INJECTION, SOLUTION INTRAMUSCULAR
Status: DISCONTINUED | OUTPATIENT
Start: 2024-10-07 | End: 2024-10-08 | Stop reason: HOSPADM

## 2024-10-07 RX ORDER — MAGNESIUM SULFATE IN WATER 40 MG/ML
2 INJECTION, SOLUTION INTRAVENOUS CONTINUOUS
Status: DISCONTINUED | OUTPATIENT
Start: 2024-10-07 | End: 2024-10-07

## 2024-10-07 RX ORDER — METOCLOPRAMIDE 10 MG/1
10 TABLET ORAL EVERY 6 HOURS PRN
Status: DISCONTINUED | OUTPATIENT
Start: 2024-10-07 | End: 2024-10-08 | Stop reason: HOSPADM

## 2024-10-07 RX ORDER — EPHEDRINE SULFATE 50 MG/ML
5 INJECTION, SOLUTION INTRAMUSCULAR; INTRAVENOUS; SUBCUTANEOUS
Status: DISCONTINUED | OUTPATIENT
Start: 2024-10-07 | End: 2024-10-08 | Stop reason: HOSPADM

## 2024-10-07 RX ADMIN — BETAMETHASONE ACETATE AND BETAMETHASONE SODIUM PHOSPHATE 12 MG: 3; 3 INJECTION, SUSPENSION INTRA-ARTICULAR; INTRALESIONAL; INTRAMUSCULAR; SOFT TISSUE at 18:17

## 2024-10-07 RX ADMIN — PRENATAL VITAMINS-IRON FUMARATE 27 MG IRON-FOLIC ACID 0.8 MG TABLET 1 TABLET: at 08:10

## 2024-10-07 RX ADMIN — Medication 100 MCG: at 07:01

## 2024-10-07 RX ADMIN — Medication 100 MCG: at 06:58

## 2024-10-07 RX ADMIN — AMPICILLIN SODIUM 2 G: 2 INJECTION, POWDER, FOR SOLUTION INTRAMUSCULAR; INTRAVENOUS at 12:26

## 2024-10-07 RX ADMIN — AMPICILLIN SODIUM 2 G: 2 INJECTION, POWDER, FOR SOLUTION INTRAMUSCULAR; INTRAVENOUS at 18:26

## 2024-10-07 RX ADMIN — AMPICILLIN SODIUM 2 G: 2 INJECTION, POWDER, FOR SOLUTION INTRAMUSCULAR; INTRAVENOUS at 06:11

## 2024-10-07 RX ADMIN — DULOXETINE HYDROCHLORIDE 120 MG: 60 CAPSULE, DELAYED RELEASE ORAL at 08:10

## 2024-10-07 RX ADMIN — HYDROXYZINE HYDROCHLORIDE 50 MG: 25 TABLET, FILM COATED ORAL at 22:41

## 2024-10-07 RX ADMIN — Medication: at 14:17

## 2024-10-07 RX ADMIN — EPHEDRINE SULFATE 5 MG: 5 INJECTION INTRAVENOUS at 07:15

## 2024-10-07 RX ADMIN — Medication: at 04:50

## 2024-10-07 RX ADMIN — SODIUM CHLORIDE, POTASSIUM CHLORIDE, SODIUM LACTATE AND CALCIUM CHLORIDE: 600; 310; 30; 20 INJECTION, SOLUTION INTRAVENOUS at 22:43

## 2024-10-07 RX ADMIN — METOCLOPRAMIDE HYDROCHLORIDE 10 MG: 5 INJECTION INTRAMUSCULAR; INTRAVENOUS at 10:26

## 2024-10-07 RX ADMIN — SODIUM CHLORIDE, POTASSIUM CHLORIDE, SODIUM LACTATE AND CALCIUM CHLORIDE 1000 ML: 600; 310; 30; 20 INJECTION, SOLUTION INTRAVENOUS at 03:56

## 2024-10-07 RX ADMIN — LEVOTHYROXINE SODIUM 100 MCG: 100 TABLET ORAL at 08:10

## 2024-10-07 RX ADMIN — ACETAMINOPHEN 650 MG: 325 TABLET ORAL at 05:26

## 2024-10-07 RX ADMIN — NIFEDIPINE 20 MG: 10 CAPSULE ORAL at 06:11

## 2024-10-07 RX ADMIN — MONTELUKAST 10 MG: 10 TABLET, FILM COATED ORAL at 18:13

## 2024-10-07 RX ADMIN — ROPIVACAINE HYDROCHLORIDE 10 ML: 2 INJECTION, SOLUTION EPIDURAL; INFILTRATION at 04:47

## 2024-10-07 RX ADMIN — SODIUM CHLORIDE, POTASSIUM CHLORIDE, SODIUM LACTATE AND CALCIUM CHLORIDE: 600; 310; 30; 20 INJECTION, SOLUTION INTRAVENOUS at 09:27

## 2024-10-07 RX ADMIN — FAMOTIDINE 10 MG: 10 TABLET, FILM COATED ORAL at 18:13

## 2024-10-07 RX ADMIN — ONDANSETRON 4 MG: 2 INJECTION INTRAMUSCULAR; INTRAVENOUS at 12:37

## 2024-10-07 RX ADMIN — MAGNESIUM SULFATE HEPTAHYDRATE 2 G/HR: 40 INJECTION, SOLUTION INTRAVENOUS at 06:30

## 2024-10-07 RX ADMIN — Medication 100 MCG: at 06:50

## 2024-10-07 RX ADMIN — Medication 100 MCG: at 05:17

## 2024-10-07 RX ADMIN — FAMOTIDINE 10 MG: 10 TABLET, FILM COATED ORAL at 08:11

## 2024-10-07 RX ADMIN — METOCLOPRAMIDE HYDROCHLORIDE 10 MG: 5 INJECTION INTRAMUSCULAR; INTRAVENOUS at 16:26

## 2024-10-07 RX ADMIN — AMPICILLIN SODIUM 2 G: 2 INJECTION, POWDER, FOR SOLUTION INTRAMUSCULAR; INTRAVENOUS at 00:01

## 2024-10-07 ASSESSMENT — ACTIVITIES OF DAILY LIVING (ADL)
ADLS_ACUITY_SCORE: 18
ADLS_ACUITY_SCORE: 19
ADLS_ACUITY_SCORE: 18

## 2024-10-07 NOTE — PROVIDER NOTIFICATION
10/07/24 0222   Provider Notification   Provider Name/Title Dr Loyd   Method of Notification Electronic Page     Page update to Dr Loyd:  Jessica C 220 Cxn q4-6, less painful than earlier but unable to sleep despite hydroxyzine. No pain meds needed. FHR cat1. Noelle Lobato

## 2024-10-07 NOTE — PROVIDER NOTIFICATION
Notified Dr Genao that pt is having low BP's, 4 doses of phenylephrine given. Elevated HR, had headache prior to epidural. FHR and pt asymptomatic. Per Dr Genao, give 1 dose of ephedrine. If in half hour blood pressures do not improve, RN to call MD to consider lowering epidural rate.

## 2024-10-07 NOTE — ANESTHESIA PREPROCEDURE EVALUATION
"Anesthesia Pre-Procedure Evaluation    Patient: Jessica Sierra   MRN: [id number] : 1990        Procedure :           Past Medical History:   Diagnosis Date    Anxiety     Irritable bowel syndrome     Migraine     Thyroid disease       Past Surgical History:   Procedure Laterality Date    septoplasty Bilateral       Allergies   Allergen Reactions    Lactose     Milk Protein     Milk-Related Compounds     Nitrofurantoin Hives    Nsaids Rash      Social History     Tobacco Use    Smoking status: Never    Smokeless tobacco: Never   Substance Use Topics    Alcohol use: Not Currently      Wt Readings from Last 1 Encounters:   10/06/24 70.3 kg (155 lb)        Anesthesia Evaluation            ROS/MED HX  ENT/Pulmonary:  - neg pulmonary ROS     Neurologic: Comment: migraine      Cardiovascular:  - neg cardiovascular ROS     METS/Exercise Tolerance:     Hematologic:  - neg hematologic  ROS     Musculoskeletal:       GI/Hepatic:  - neg GI/hepatic ROS     Renal/Genitourinary:       Endo:     (+)          thyroid problem,            Psychiatric/Substance Use:     (+) psychiatric history anxiety       Infectious Disease:       Malignancy:       Other:            Physical Exam    Airway        Mallampati: II   TM distance: > 3 FB   Neck ROM: full   Mouth opening: > 3 cm    Respiratory Devices and Support         Dental  no notable dental history         Cardiovascular   cardiovascular exam normal          Pulmonary   pulmonary exam normal                OUTSIDE LABS:  CBC:   Lab Results   Component Value Date    WBC 13.9 (H) 10/06/2024    WBC 12.4 (H) 2024    HGB 12.7 10/06/2024    HGB 12.1 2024    HCT 38.2 10/06/2024    HCT 36.8 2024     10/06/2024     2024     BMP: No results found for: \"NA\", \"POTASSIUM\", \"CHLORIDE\", \"CO2\", \"BUN\", \"CR\", \"GLC\"  COAGS: No results found for: \"PTT\", \"INR\", \"FIBR\"  POC: No results found for: \"BGM\", \"HCG\", \"HCGS\"  HEPATIC: No results found for: " "\"ALBUMIN\", \"PROTTOTAL\", \"ALT\", \"AST\", \"GGT\", \"ALKPHOS\", \"BILITOTAL\", \"BILIDIRECT\", \"LACEY\"  OTHER:   Lab Results   Component Value Date    A1C 5.1 05/08/2024    TSH 2.55 09/18/2024       Anesthesia Plan    ASA Status:  2       Anesthesia Type: Epidural.              Consents    Anesthesia Plan(s) and associated risks, benefits, and realistic alternatives discussed. Questions answered and patient/representative(s) expressed understanding.     - Discussed:     - Discussed with:  Patient            Postoperative Care            Comments:           neg OB ROS.      Lane Olson MD    I have reviewed the pertinent notes and labs in the chart from the past 30 days and (re)examined the patient.  Any updates or changes from those notes are reflected in this note.            # Drug Induced Platelet Defect: home medication list includes an antiplatelet medication                     "

## 2024-10-07 NOTE — PLAN OF CARE
"Patient remains afebrile. Blood pressures remain in 90s-100s systolic however patient asymptomatic, and states her normal bp is usually \"100/65\".    Contractions are irregular, q4-15mins. FHR category 1.  Tocolytics were discontinued this afternoon per Dr Yao's orders.    2nd dose of betamethasone IM given at 1817.  "

## 2024-10-07 NOTE — PLAN OF CARE
Contractions had spaced to 10-15 minutes, spoke to Dr Yao about potentially decreasing or stopping epidural as labor had slowed.  RN to call MDA to determine plan regarding epidural.    RN spoke to Dr Genao about decreasing epidural rate.  Order received to decrease continuous rate to 4ml/hr.  Dr Genao also states that patient may eat meals.    RN called into room and patient stating she is feeling contractions every 20 minutes, some stronger than others. Pt would like to increase rate to 6ml/hr.    Dr Franklin called and order received to increase rate from 4ml/hr to 6ml/hr.

## 2024-10-07 NOTE — PLAN OF CARE
FHR cat 1. Pt rickie through the night. Palpate mild to moderate.Making slow cervical change.  Comfortable after epidural. Afebrile. HR elevated at times. Mag gtt started at 0630. Blood pressures dropping, anesthesia notified, see note. C/O headache, tylenol given. CBC not collected.

## 2024-10-07 NOTE — CONSULTS
[facility]                Neonatology Antepartum Counseling Consult:  I was asked to provide antepartum counseling for Jessica Sierra at the request of Tom Loyd MD secondary to PPROM and  labor. Ms. Jessica Sierra is currently 33 weeks/ 3 days gestation and has a history significant for:  Patient Active Problem List   Diagnosis    Indication for care in labor or delivery      Betamethasone was administered on 10/6.   Ms. Jessica Sierra, accompanied by her , was counseled on the expected hospital course, potential risks, and outcomes associated with an infant born at this gestation. The counseling included: morbidity, mortality, initial delivery room stabilization, respiratory course, lung development, hyperbilirubinemia, hemodynamic support, infection, intraventricular hemorrhage, nutrition, growth and development, and long term outcomes.  I also explained the basic four criteria for discharge: that the baby had to be free of apnea; able to maintain their body temperature; able to feed by bottle or breast well enough to; attain an adequate pattern of weight gain and growth.  The patient had no remaining questions but was encouraged to contact the NICU via their caregivers should any arise.  Please feel free to call and thank you for involving the NICU team in the care of your patient.      Floor Time (min): 10  Face to Face Time (min): 20  Total Time (minutes): 30  More than 50% of my time was spent in direct, face to face, antepartum counseling with the above patient.    MADHAV Bahena, NNP-BC 10/7/2024 7:05 AM

## 2024-10-07 NOTE — PROVIDER NOTIFICATION
Dr Genao called that BP remains hypotensive.  Orders received to lower epidural rate to 10ml/hr, and may give ephedrine as needed.

## 2024-10-07 NOTE — PROVIDER NOTIFICATION
10/06/24 1950   Provider Notification   Provider Name/Title Dr Loyd   Method of Notification Phone     Per Dr Loyd, RN to order pepcid, montelukast, and reglan per home routine. Also, perform VSq4, order CBC for the morning and be vigilent for signs of Triple I. TORB

## 2024-10-07 NOTE — PROVIDER NOTIFICATION
10/07/24 0400   Provider Notification   Provider Name/Title Dr Loyd   Method of Notification Phone   Request Evaluate - Remote     Per Dr Loyd RN to check cervix - 1.5//0.   RN to recheck cervix at 0600 and inform MD. PLUNKETT

## 2024-10-07 NOTE — PROVIDER NOTIFICATION
10/07/24 0601   Provider Notification   Provider Name/Title Dr Loyd   Method of Notification Electronic Page     Page to Dr Loyd:  Jessica ROBLES 220- 2//0. CXN q1.5-7. Please call Noelle at [phone]. Thank you!

## 2024-10-07 NOTE — PLAN OF CARE
Page to Dr Loyd:    Jessica ROBLES Room 220 Feeling cxn's, tightening/pressure/back pain. Palpate moderate. FHR cat 1. PLease call Noelle [phone]. Thank you!

## 2024-10-07 NOTE — PROVIDER NOTIFICATION
Called Dr Yao to update him on patient's Bps and current contraction pattern of ~[id number].  Dr Yao stated that he had spoken to MFKENDY PERERA, who recommended to discontinue all tocolytics.  Orders received to stop magnesium infusion, and stop procardia.    Pt agreeable to plan and verbalized understanding.

## 2024-10-07 NOTE — PLAN OF CARE
Patient admitted to room 220 accompanied by spouse. Plan to do antibiotics, monitor baby and contractions, and give first Beta dose. Dr Loyd to speak with them regarding plan of care. Report from Marely Harvey RN.

## 2024-10-07 NOTE — PROVIDER NOTIFICATION
Jessica ROBLES Room 220 Pt requested Pepcid BID, reglan with meals, Montelukast daily. FHR tachy 170's since 1916. PT not feeling contractions. Please call ELMER Drake [phone]. Thank you!   10/06/24 1946   Provider Notification   Provider Name/Title Dr Loyd   Method of Notification Electronic Page

## 2024-10-07 NOTE — PROVIDER NOTIFICATION
10/07/24 0358   Provider Notification   Provider Name/Title Dr Loyd   Method of Notification Electronic Page   Request Evaluate - Remote   Notification Reason Pain;Uterine Activity     Page update to Dr Loyd:  Jessica ROBLES 220 Rating contraction pain 3-5/10. Requesting epidural. Thank you, Noelle

## 2024-10-07 NOTE — PROVIDER NOTIFICATION
10/06/24 6016   Provider Notification   Provider Name/Title Dr Loyd   Method of Notification Phone   Request Evaluate - Remote   Notification Reason Labor Status;Uterine Activity;Pain;Status Update;SVE     Notified Dr Loyd that pt is 1-2/90/0, rating contractions 3-4/10. Per Dr Loyd, RN to give next dose of Procardia, add intrapartum orders. Pt can have pain medications, including epidural anytime for labor pain. Not starting magnesium at this time. RN will update MD in 1-2 hours. KIARRA

## 2024-10-07 NOTE — PROVIDER NOTIFICATION
10/07/24 0603   Provider Notification   Provider Name/Title Dr Loyd   Method of Notification Phone   Request Evaluate - Remote   Notification Reason SVE;Status Update;Uterine Activity;Labor Status;Pain;Other (Comment)     Notified Dr Loyd that pt is comfortable with epidural, now 2cm. Per Dr Loyd, continue Procardia and start magnesium for neuro protection without loading dose. RN will add orders. Dr Loyd will round later this morning. TORB

## 2024-10-07 NOTE — ANESTHESIA PROCEDURE NOTES
Epidural catheter Procedure Note    Pre-Procedure   Staff -        Anesthesiologist:  Lane Olson MD       Performed By: anesthesiologist       Location: OB       Procedure Start/Stop Times: 10/7/2024 4:47 AM and 10/7/2024 5:15 AM       Pre-Anesthestic Checklist: patient identified, IV checked, site marked, risks and benefits discussed, informed consent, monitors and equipment checked, pre-op evaluation and at physician/surgeon's request  Timeout:       Correct Patient: Yes        Correct Procedure: Yes        Correct Site: Yes        Correct Position: Yes   Procedure Documentation  Procedure: epidural catheter       Patient Position: sitting       Skin prep: Betadine       Local skin infiltrated with 1 mL of 1% lidocaine.        Insertion Site: L3-4. (midline approach).       Technique: LORT saline and LORT air        COOKIE at 4 cm.       Needle Type: Touhy needle       Needle Gauge: 17.        Needle Length (Inches): 3.5        Catheter: 19 G.          Catheter threaded easily.         5 cm epidural space.         Threaded 9 cm at skin.         # of attempts: 2 and  # of redirects:     Assessment/Narrative         Paresthesias: No.       Test dose of 3 mL lidocaine 1.5% w/ 1:200,000 epinephrine at.         Test dose negative, 3 minutes after injection, for signs of intravascular, subdural, or intrathecal injection.       Insertion/Infusion Method: LORT saline and LORT air       Aspiration negative for Heme or CSF via Epidural Catheter.    Medication(s) Administered   0.2% Ropivacaine (Epidural) - EPIDURAL   10 mL - 10/7/2024 4:47:00 AM  Medication Administration Time: 10/7/2024 4:47 AM     Comments:  Pre-procedure time out completed. Patient in sitting position, the lumbar spine was prepped and draped in sterile fashion. The L3/L4 interspace was identified and local anesthetic was injected for local skin infiltration. A 17 G touhy needle was advanced to the epidural space which was confirmed with the loss of  "resistance technique at 4 cm. A catheter was then advanced easily into the epidural space. The catheter was left at 9 cm at the skin. Negative aspiration of blood and CSF was confirmed. A test dose of 1.5% lidocaine with 1:200,000 epinephrine was injected through the catheter and was negative for intravascular injection. The site was covered with sterile tegaderm and the catheter was secured with tape.    Orders to manage the epidural infusion have been entered and, through coordination with the nurse, we will continue to manage and monitor the patient's labor epidural.  We will continuously be available to adjust as needed throughout the entire labor and delivery process.    With first attempt, patient had a vagal episode and needed to lay down. Epidural with loss of resistance, but unable to get catheter in and tested prior to patients symptoms.       FOR [facility] ([facility]) ONLY:   Pain Team Contact information: please page the Pain Team Via [employer]. Search \"Pain\". During daytime hours, please page the attending first. At night please page the resident first.      "

## 2024-10-08 ENCOUNTER — APPOINTMENT (OUTPATIENT)
Dept: ULTRASOUND IMAGING | Facility: CLINIC | Age: 34
End: 2024-10-08
Attending: OBSTETRICS & GYNECOLOGY
Payer: COMMERCIAL

## 2024-10-08 ENCOUNTER — ANESTHESIA EVENT (OUTPATIENT)
Dept: SURGERY | Facility: CLINIC | Age: 34
End: 2024-10-08
Payer: COMMERCIAL

## 2024-10-08 ENCOUNTER — ANESTHESIA (OUTPATIENT)
Dept: SURGERY | Facility: CLINIC | Age: 34
End: 2024-10-08
Payer: COMMERCIAL

## 2024-10-08 LAB
APTT PPP: 28 SECONDS (ref 22–38)
BLD PROD TYP BPU: NORMAL
BLOOD COMPONENT TYPE: NORMAL
CODING SYSTEM: NORMAL
CROSSMATCH: NORMAL
ERYTHROCYTE [DISTWIDTH] IN BLOOD BY AUTOMATED COUNT: 13 % (ref 10–15)
FIBRINOGEN PPP-MCNC: 337 MG/DL (ref 170–510)
HCT VFR BLD AUTO: 31.8 % (ref 35–47)
HGB BLD-MCNC: 10.3 G/DL (ref 11.7–15.7)
HGB BLD-MCNC: 8.4 G/DL (ref 11.7–15.7)
HOLD SPECIMEN: 0
INR PPP: 1.22 (ref 0.85–1.15)
ISSUE DATE AND TIME: NORMAL
MCH RBC QN AUTO: 30 PG (ref 26.5–33)
MCHC RBC AUTO-ENTMCNC: 32.4 G/DL (ref 31.5–36.5)
MCV RBC AUTO: 93 FL (ref 78–100)
PLATELET # BLD AUTO: 180 10E3/UL (ref 150–450)
PLATELET # BLD AUTO: 180 10E3/UL (ref 150–450)
POTASSIUM SERPL-SCNC: 4.5 MMOL/L (ref 3.4–5.3)
RBC # BLD AUTO: 3.43 10E6/UL (ref 3.8–5.2)
UNIT ABO/RH: NORMAL
UNIT NUMBER: NORMAL
UNIT STATUS: NORMAL
UNIT TYPE ISBT: 1700
UNIT TYPE ISBT: 5100
UNIT TYPE ISBT: 7300
WBC # BLD AUTO: 17.5 10E3/UL (ref 4–11)

## 2024-10-08 PROCEDURE — 85014 HEMATOCRIT: CPT | Performed by: OBSTETRICS & GYNECOLOGY

## 2024-10-08 PROCEDURE — 85730 THROMBOPLASTIN TIME PARTIAL: CPT | Performed by: OBSTETRICS & GYNECOLOGY

## 2024-10-08 PROCEDURE — 250N000013 HC RX MED GY IP 250 OP 250 PS 637: Performed by: OBSTETRICS & GYNECOLOGY

## 2024-10-08 PROCEDURE — 0UDB7ZZ EXTRACTION OF ENDOMETRIUM, VIA NATURAL OR ARTIFICIAL OPENING: ICD-10-PCS | Performed by: OBSTETRICS & GYNECOLOGY

## 2024-10-08 PROCEDURE — 999N000063 US INTRAOPERATIVE: Mod: TC

## 2024-10-08 PROCEDURE — 85610 PROTHROMBIN TIME: CPT | Performed by: OBSTETRICS & GYNECOLOGY

## 2024-10-08 PROCEDURE — 0UQMXZZ REPAIR VULVA, EXTERNAL APPROACH: ICD-10-PCS | Performed by: OBSTETRICS & GYNECOLOGY

## 2024-10-08 PROCEDURE — 250N000009 HC RX 250: Performed by: NURSE ANESTHETIST, CERTIFIED REGISTERED

## 2024-10-08 PROCEDURE — 88307 TISSUE EXAM BY PATHOLOGIST: CPT | Mod: TC | Performed by: OBSTETRICS & GYNECOLOGY

## 2024-10-08 PROCEDURE — P9045 ALBUMIN (HUMAN), 5%, 250 ML: HCPCS | Performed by: NURSE ANESTHETIST, CERTIFIED REGISTERED

## 2024-10-08 PROCEDURE — 88307 TISSUE EXAM BY PATHOLOGIST: CPT | Mod: 26 | Performed by: PATHOLOGY

## 2024-10-08 PROCEDURE — 36415 COLL VENOUS BLD VENIPUNCTURE: CPT | Performed by: OBSTETRICS & GYNECOLOGY

## 2024-10-08 PROCEDURE — 250N000009 HC RX 250: Performed by: OBSTETRICS & GYNECOLOGY

## 2024-10-08 PROCEDURE — P9059 PLASMA, FRZ BETWEEN 8-24HOUR: HCPCS

## 2024-10-08 PROCEDURE — P9012 CRYOPRECIPITATE EACH UNIT: HCPCS

## 2024-10-08 PROCEDURE — 710N000009 HC RECOVERY PHASE 1, LEVEL 1, PER MIN: Performed by: OBSTETRICS & GYNECOLOGY

## 2024-10-08 PROCEDURE — 250N000011 HC RX IP 250 OP 636: Performed by: OBSTETRICS & GYNECOLOGY

## 2024-10-08 PROCEDURE — 10D17Z9 MANUAL EXTRACTION OF PRODUCTS OF CONCEPTION, RETAINED, VIA NATURAL OR ARTIFICIAL OPENING: ICD-10-PCS | Performed by: OBSTETRICS & GYNECOLOGY

## 2024-10-08 PROCEDURE — 99140 ANES COMP EMERGENCY COND: CPT | Performed by: NURSE ANESTHETIST, CERTIFIED REGISTERED

## 2024-10-08 PROCEDURE — 120N000012 HC R&B POSTPARTUM

## 2024-10-08 PROCEDURE — 58120 DILATION AND CURETTAGE: CPT | Performed by: NURSE ANESTHETIST, CERTIFIED REGISTERED

## 2024-10-08 PROCEDURE — 250N000011 HC RX IP 250 OP 636: Performed by: ANESTHESIOLOGY

## 2024-10-08 PROCEDURE — 0HQ9XZZ REPAIR PERINEUM SKIN, EXTERNAL APPROACH: ICD-10-PCS | Performed by: OBSTETRICS & GYNECOLOGY

## 2024-10-08 PROCEDURE — P9035 PLATELET PHERES LEUKOREDUCED: HCPCS

## 2024-10-08 PROCEDURE — P9016 RBC LEUKOCYTES REDUCED: HCPCS | Performed by: ANESTHESIOLOGY

## 2024-10-08 PROCEDURE — 85384 FIBRINOGEN ACTIVITY: CPT | Performed by: OBSTETRICS & GYNECOLOGY

## 2024-10-08 PROCEDURE — 370N000017 HC ANESTHESIA TECHNICAL FEE, PER MIN: Performed by: OBSTETRICS & GYNECOLOGY

## 2024-10-08 PROCEDURE — 250N000025 HC SEVOFLURANE, PER MIN: Performed by: OBSTETRICS & GYNECOLOGY

## 2024-10-08 PROCEDURE — 999N000016 HC STATISTIC ATTENDANCE AT DELIVERY

## 2024-10-08 PROCEDURE — 722N000001 HC LABOR CARE VAGINAL DELIVERY SINGLE

## 2024-10-08 PROCEDURE — P9016 RBC LEUKOCYTES REDUCED: HCPCS | Performed by: OBSTETRICS & GYNECOLOGY

## 2024-10-08 PROCEDURE — 85018 HEMOGLOBIN: CPT | Performed by: OBSTETRICS & GYNECOLOGY

## 2024-10-08 PROCEDURE — 272N000001 HC OR GENERAL SUPPLY STERILE: Performed by: OBSTETRICS & GYNECOLOGY

## 2024-10-08 PROCEDURE — 250N000011 HC RX IP 250 OP 636: Performed by: NURSE ANESTHETIST, CERTIFIED REGISTERED

## 2024-10-08 PROCEDURE — P9016 RBC LEUKOCYTES REDUCED: HCPCS

## 2024-10-08 PROCEDURE — 84132 ASSAY OF SERUM POTASSIUM: CPT | Performed by: OBSTETRICS & GYNECOLOGY

## 2024-10-08 PROCEDURE — 360N000076 HC SURGERY LEVEL 3, PER MIN: Performed by: OBSTETRICS & GYNECOLOGY

## 2024-10-08 PROCEDURE — 258N000003 HC RX IP 258 OP 636: Performed by: NURSE ANESTHETIST, CERTIFIED REGISTERED

## 2024-10-08 PROCEDURE — 58120 DILATION AND CURETTAGE: CPT | Performed by: ANESTHESIOLOGY

## 2024-10-08 RX ORDER — OXYTOCIN/0.9 % SODIUM CHLORIDE 30/500 ML
340 PLASTIC BAG, INJECTION (ML) INTRAVENOUS CONTINUOUS PRN
Status: DISCONTINUED | OUTPATIENT
Start: 2024-10-08 | End: 2024-10-08 | Stop reason: HOSPADM

## 2024-10-08 RX ORDER — FENTANYL CITRATE 0.05 MG/ML
25 INJECTION, SOLUTION INTRAMUSCULAR; INTRAVENOUS EVERY 5 MIN PRN
Status: DISCONTINUED | OUTPATIENT
Start: 2024-10-08 | End: 2024-10-08 | Stop reason: HOSPADM

## 2024-10-08 RX ORDER — OXYCODONE HYDROCHLORIDE 5 MG/1
5 TABLET ORAL EVERY 4 HOURS PRN
Status: DISCONTINUED | OUTPATIENT
Start: 2024-10-08 | End: 2024-10-11 | Stop reason: HOSPADM

## 2024-10-08 RX ORDER — MISOPROSTOL 200 UG/1
800 TABLET ORAL
Status: DISCONTINUED | OUTPATIENT
Start: 2024-10-08 | End: 2024-10-08 | Stop reason: HOSPADM

## 2024-10-08 RX ORDER — CEFAZOLIN SODIUM/WATER 2 G/20 ML
2 SYRINGE (ML) INTRAVENOUS
Status: DISCONTINUED | OUTPATIENT
Start: 2024-10-08 | End: 2024-10-08 | Stop reason: HOSPADM

## 2024-10-08 RX ORDER — SODIUM CHLORIDE, SODIUM LACTATE, POTASSIUM CHLORIDE, CALCIUM CHLORIDE 600; 310; 30; 20 MG/100ML; MG/100ML; MG/100ML; MG/100ML
INJECTION, SOLUTION INTRAVENOUS CONTINUOUS
Status: DISCONTINUED | OUTPATIENT
Start: 2024-10-08 | End: 2024-10-08 | Stop reason: HOSPADM

## 2024-10-08 RX ORDER — LOPERAMIDE HYDROCHLORIDE 2 MG/1
4 CAPSULE ORAL
Status: DISCONTINUED | OUTPATIENT
Start: 2024-10-08 | End: 2024-10-11 | Stop reason: HOSPADM

## 2024-10-08 RX ORDER — LOPERAMIDE HYDROCHLORIDE 2 MG/1
2 CAPSULE ORAL
Status: DISCONTINUED | OUTPATIENT
Start: 2024-10-08 | End: 2024-10-08 | Stop reason: HOSPADM

## 2024-10-08 RX ORDER — ETOMIDATE 2 MG/ML
INJECTION INTRAVENOUS PRN
Status: DISCONTINUED | OUTPATIENT
Start: 2024-10-08 | End: 2024-10-08

## 2024-10-08 RX ORDER — TRANEXAMIC ACID 10 MG/ML
1 INJECTION, SOLUTION INTRAVENOUS EVERY 30 MIN PRN
Status: DISCONTINUED | OUTPATIENT
Start: 2024-10-08 | End: 2024-10-11 | Stop reason: HOSPADM

## 2024-10-08 RX ORDER — OXYTOCIN/0.9 % SODIUM CHLORIDE 30/500 ML
100-340 PLASTIC BAG, INJECTION (ML) INTRAVENOUS CONTINUOUS PRN
Status: DISCONTINUED | OUTPATIENT
Start: 2024-10-08 | End: 2024-10-11

## 2024-10-08 RX ORDER — CITRIC ACID/SODIUM CITRATE 334-500MG
30 SOLUTION, ORAL ORAL
Status: DISCONTINUED | OUTPATIENT
Start: 2024-10-08 | End: 2024-10-08 | Stop reason: HOSPADM

## 2024-10-08 RX ORDER — METHYLERGONOVINE MALEATE 0.2 MG/ML
200 INJECTION INTRAVENOUS
Status: DISCONTINUED | OUTPATIENT
Start: 2024-10-08 | End: 2024-10-08 | Stop reason: HOSPADM

## 2024-10-08 RX ORDER — SODIUM CHLORIDE, SODIUM LACTATE, POTASSIUM CHLORIDE, CALCIUM CHLORIDE 600; 310; 30; 20 MG/100ML; MG/100ML; MG/100ML; MG/100ML
INJECTION, SOLUTION INTRAVENOUS CONTINUOUS
Status: DISCONTINUED | OUTPATIENT
Start: 2024-10-08 | End: 2024-10-08

## 2024-10-08 RX ORDER — ONDANSETRON 2 MG/ML
4 INJECTION INTRAMUSCULAR; INTRAVENOUS EVERY 30 MIN PRN
Status: DISCONTINUED | OUTPATIENT
Start: 2024-10-08 | End: 2024-10-08 | Stop reason: HOSPADM

## 2024-10-08 RX ORDER — BISACODYL 10 MG
10 SUPPOSITORY, RECTAL RECTAL DAILY PRN
Status: DISCONTINUED | OUTPATIENT
Start: 2024-10-08 | End: 2024-10-11 | Stop reason: HOSPADM

## 2024-10-08 RX ORDER — TRANEXAMIC ACID 10 MG/ML
1 INJECTION, SOLUTION INTRAVENOUS EVERY 30 MIN PRN
Status: DISCONTINUED | OUTPATIENT
Start: 2024-10-08 | End: 2024-10-08 | Stop reason: HOSPADM

## 2024-10-08 RX ORDER — CARBOPROST TROMETHAMINE 250 UG/ML
250 INJECTION, SOLUTION INTRAMUSCULAR
Status: DISCONTINUED | OUTPATIENT
Start: 2024-10-08 | End: 2024-10-08 | Stop reason: HOSPADM

## 2024-10-08 RX ORDER — MISOPROSTOL 200 UG/1
400 TABLET ORAL
Status: DISCONTINUED | OUTPATIENT
Start: 2024-10-08 | End: 2024-10-08 | Stop reason: HOSPADM

## 2024-10-08 RX ORDER — LIDOCAINE 40 MG/G
CREAM TOPICAL
Status: DISCONTINUED | OUTPATIENT
Start: 2024-10-08 | End: 2024-10-08 | Stop reason: HOSPADM

## 2024-10-08 RX ORDER — DOCUSATE SODIUM 100 MG/1
100 CAPSULE, LIQUID FILLED ORAL DAILY
Status: DISCONTINUED | OUTPATIENT
Start: 2024-10-08 | End: 2024-10-09

## 2024-10-08 RX ORDER — OXYTOCIN/0.9 % SODIUM CHLORIDE 30/500 ML
1-24 PLASTIC BAG, INJECTION (ML) INTRAVENOUS CONTINUOUS
Status: DISCONTINUED | OUTPATIENT
Start: 2024-10-08 | End: 2024-10-08

## 2024-10-08 RX ORDER — CEFAZOLIN SODIUM/WATER 2 G/20 ML
2 SYRINGE (ML) INTRAVENOUS SEE ADMIN INSTRUCTIONS
Status: DISCONTINUED | OUTPATIENT
Start: 2024-10-08 | End: 2024-10-08 | Stop reason: HOSPADM

## 2024-10-08 RX ORDER — MAGNESIUM HYDROXIDE 1200 MG/15ML
LIQUID ORAL PRN
Status: DISCONTINUED | OUTPATIENT
Start: 2024-10-08 | End: 2024-10-08 | Stop reason: HOSPADM

## 2024-10-08 RX ORDER — MISOPROSTOL 200 UG/1
800 TABLET ORAL
Status: DISCONTINUED | OUTPATIENT
Start: 2024-10-08 | End: 2024-10-11 | Stop reason: HOSPADM

## 2024-10-08 RX ORDER — MISOPROSTOL 200 UG/1
400 TABLET ORAL
Status: DISCONTINUED | OUTPATIENT
Start: 2024-10-08 | End: 2024-10-11 | Stop reason: HOSPADM

## 2024-10-08 RX ORDER — ONDANSETRON 2 MG/ML
INJECTION INTRAMUSCULAR; INTRAVENOUS PRN
Status: DISCONTINUED | OUTPATIENT
Start: 2024-10-08 | End: 2024-10-08

## 2024-10-08 RX ORDER — AZITHROMYCIN 500 MG/1
500 INJECTION, POWDER, LYOPHILIZED, FOR SOLUTION INTRAVENOUS
Status: DISCONTINUED | OUTPATIENT
Start: 2024-10-08 | End: 2024-10-08 | Stop reason: HOSPADM

## 2024-10-08 RX ORDER — ONDANSETRON 4 MG/1
4 TABLET, ORALLY DISINTEGRATING ORAL EVERY 30 MIN PRN
Status: DISCONTINUED | OUTPATIENT
Start: 2024-10-08 | End: 2024-10-08 | Stop reason: HOSPADM

## 2024-10-08 RX ORDER — OXYTOCIN 10 [USP'U]/ML
10 INJECTION, SOLUTION INTRAMUSCULAR; INTRAVENOUS
Status: DISCONTINUED | OUTPATIENT
Start: 2024-10-08 | End: 2024-10-08

## 2024-10-08 RX ORDER — LIDOCAINE HYDROCHLORIDE 20 MG/ML
INJECTION, SOLUTION INFILTRATION; PERINEURAL PRN
Status: DISCONTINUED | OUTPATIENT
Start: 2024-10-08 | End: 2024-10-08

## 2024-10-08 RX ORDER — ACETAMINOPHEN 325 MG/1
650 TABLET ORAL EVERY 4 HOURS PRN
Status: DISCONTINUED | OUTPATIENT
Start: 2024-10-08 | End: 2024-10-11 | Stop reason: HOSPADM

## 2024-10-08 RX ORDER — MEPERIDINE HYDROCHLORIDE 25 MG/ML
12.5 INJECTION INTRAMUSCULAR; INTRAVENOUS; SUBCUTANEOUS ONCE
Status: COMPLETED | OUTPATIENT
Start: 2024-10-08 | End: 2024-10-08

## 2024-10-08 RX ORDER — CARBOPROST TROMETHAMINE 250 UG/ML
250 INJECTION, SOLUTION INTRAMUSCULAR
Status: DISCONTINUED | OUTPATIENT
Start: 2024-10-08 | End: 2024-10-11 | Stop reason: HOSPADM

## 2024-10-08 RX ORDER — OXYTOCIN 10 [USP'U]/ML
10 INJECTION, SOLUTION INTRAMUSCULAR; INTRAVENOUS
Status: DISCONTINUED | OUTPATIENT
Start: 2024-10-08 | End: 2024-10-11 | Stop reason: HOSPADM

## 2024-10-08 RX ORDER — AZITHROMYCIN 500 MG/1
INJECTION, POWDER, LYOPHILIZED, FOR SOLUTION INTRAVENOUS
Status: DISCONTINUED
Start: 2024-10-08 | End: 2024-10-08 | Stop reason: HOSPADM

## 2024-10-08 RX ORDER — LOPERAMIDE HYDROCHLORIDE 2 MG/1
2 CAPSULE ORAL
Status: DISCONTINUED | OUTPATIENT
Start: 2024-10-08 | End: 2024-10-11 | Stop reason: HOSPADM

## 2024-10-08 RX ORDER — HYDROCORTISONE 25 MG/G
CREAM TOPICAL 3 TIMES DAILY PRN
Status: DISCONTINUED | OUTPATIENT
Start: 2024-10-08 | End: 2024-10-11 | Stop reason: HOSPADM

## 2024-10-08 RX ORDER — HYDROMORPHONE HCL IN WATER/PF 6 MG/30 ML
0.4 PATIENT CONTROLLED ANALGESIA SYRINGE INTRAVENOUS EVERY 5 MIN PRN
Status: DISCONTINUED | OUTPATIENT
Start: 2024-10-08 | End: 2024-10-08 | Stop reason: HOSPADM

## 2024-10-08 RX ORDER — DEXAMETHASONE SODIUM PHOSPHATE 4 MG/ML
INJECTION, SOLUTION INTRA-ARTICULAR; INTRALESIONAL; INTRAMUSCULAR; INTRAVENOUS; SOFT TISSUE PRN
Status: DISCONTINUED | OUTPATIENT
Start: 2024-10-08 | End: 2024-10-08

## 2024-10-08 RX ORDER — MODIFIED LANOLIN
OINTMENT (GRAM) TOPICAL
Status: DISCONTINUED | OUTPATIENT
Start: 2024-10-08 | End: 2024-10-11 | Stop reason: HOSPADM

## 2024-10-08 RX ORDER — FENTANYL CITRATE 0.05 MG/ML
50 INJECTION, SOLUTION INTRAMUSCULAR; INTRAVENOUS EVERY 5 MIN PRN
Status: DISCONTINUED | OUTPATIENT
Start: 2024-10-08 | End: 2024-10-08 | Stop reason: HOSPADM

## 2024-10-08 RX ORDER — OXYTOCIN/0.9 % SODIUM CHLORIDE 30/500 ML
340 PLASTIC BAG, INJECTION (ML) INTRAVENOUS CONTINUOUS PRN
Status: DISCONTINUED | OUTPATIENT
Start: 2024-10-08 | End: 2024-10-11 | Stop reason: HOSPADM

## 2024-10-08 RX ORDER — OXYTOCIN 10 [USP'U]/ML
10 INJECTION, SOLUTION INTRAMUSCULAR; INTRAVENOUS
Status: DISCONTINUED | OUTPATIENT
Start: 2024-10-08 | End: 2024-10-08 | Stop reason: HOSPADM

## 2024-10-08 RX ORDER — HYDROMORPHONE HCL IN WATER/PF 6 MG/30 ML
0.2 PATIENT CONTROLLED ANALGESIA SYRINGE INTRAVENOUS EVERY 5 MIN PRN
Status: DISCONTINUED | OUTPATIENT
Start: 2024-10-08 | End: 2024-10-08 | Stop reason: HOSPADM

## 2024-10-08 RX ORDER — METHYLERGONOVINE MALEATE 0.2 MG/ML
200 INJECTION INTRAVENOUS
Status: DISCONTINUED | OUTPATIENT
Start: 2024-10-08 | End: 2024-10-11 | Stop reason: HOSPADM

## 2024-10-08 RX ORDER — LOPERAMIDE HYDROCHLORIDE 2 MG/1
4 CAPSULE ORAL
Status: DISCONTINUED | OUTPATIENT
Start: 2024-10-08 | End: 2024-10-08 | Stop reason: HOSPADM

## 2024-10-08 RX ORDER — DEXAMETHASONE SODIUM PHOSPHATE 4 MG/ML
4 INJECTION, SOLUTION INTRA-ARTICULAR; INTRALESIONAL; INTRAMUSCULAR; INTRAVENOUS; SOFT TISSUE
Status: DISCONTINUED | OUTPATIENT
Start: 2024-10-08 | End: 2024-10-08 | Stop reason: HOSPADM

## 2024-10-08 RX ORDER — FENTANYL CITRATE 50 UG/ML
INJECTION, SOLUTION INTRAMUSCULAR; INTRAVENOUS PRN
Status: DISCONTINUED | OUTPATIENT
Start: 2024-10-08 | End: 2024-10-08

## 2024-10-08 RX ORDER — TERBUTALINE SULFATE 1 MG/ML
0.25 INJECTION, SOLUTION SUBCUTANEOUS ONCE
Status: DISCONTINUED | OUTPATIENT
Start: 2024-10-08 | End: 2024-10-08

## 2024-10-08 RX ORDER — NALOXONE HYDROCHLORIDE 0.4 MG/ML
0.1 INJECTION, SOLUTION INTRAMUSCULAR; INTRAVENOUS; SUBCUTANEOUS
Status: DISCONTINUED | OUTPATIENT
Start: 2024-10-08 | End: 2024-10-08 | Stop reason: HOSPADM

## 2024-10-08 RX ADMIN — MIDAZOLAM 2 MG: 1 INJECTION INTRAMUSCULAR; INTRAVENOUS at 12:19

## 2024-10-08 RX ADMIN — FENTANYL CITRATE 50 MCG: 50 INJECTION INTRAMUSCULAR; INTRAVENOUS at 12:25

## 2024-10-08 RX ADMIN — TRANEXAMIC ACID 1 G: 10 INJECTION, SOLUTION INTRAVENOUS at 12:40

## 2024-10-08 RX ADMIN — LEVOTHYROXINE SODIUM 100 MCG: 100 TABLET ORAL at 08:11

## 2024-10-08 RX ADMIN — LIDOCAINE HYDROCHLORIDE 100 MG: 20 INJECTION, SOLUTION INFILTRATION; PERINEURAL at 12:25

## 2024-10-08 RX ADMIN — ONDANSETRON 4 MG: 2 INJECTION INTRAMUSCULAR; INTRAVENOUS at 13:04

## 2024-10-08 RX ADMIN — Medication: at 02:37

## 2024-10-08 RX ADMIN — ALBUMIN (HUMAN): 12.5 SOLUTION INTRAVENOUS at 12:34

## 2024-10-08 RX ADMIN — ALBUMIN (HUMAN): 12.5 SOLUTION INTRAVENOUS at 12:50

## 2024-10-08 RX ADMIN — DEXAMETHASONE SODIUM PHOSPHATE 4 MG: 4 INJECTION, SOLUTION INTRA-ARTICULAR; INTRALESIONAL; INTRAMUSCULAR; INTRAVENOUS; SOFT TISSUE at 13:04

## 2024-10-08 RX ADMIN — NOREPINEPHRINE BITARTRATE 6.4 MCG: 1 INJECTION, SOLUTION, CONCENTRATE INTRAVENOUS at 12:38

## 2024-10-08 RX ADMIN — MONTELUKAST 10 MG: 10 TABLET, FILM COATED ORAL at 18:01

## 2024-10-08 RX ADMIN — ACETAMINOPHEN 650 MG: 325 TABLET ORAL at 17:46

## 2024-10-08 RX ADMIN — ALBUMIN (HUMAN): 12.5 SOLUTION INTRAVENOUS at 13:18

## 2024-10-08 RX ADMIN — Medication 2 MILLI-UNITS/MIN: at 09:11

## 2024-10-08 RX ADMIN — AZITHROMYCIN MONOHYDRATE 250 MG: 500 INJECTION, POWDER, LYOPHILIZED, FOR SOLUTION INTRAVENOUS at 12:45

## 2024-10-08 RX ADMIN — METOCLOPRAMIDE HYDROCHLORIDE 10 MG: 5 INJECTION INTRAMUSCULAR; INTRAVENOUS at 07:50

## 2024-10-08 RX ADMIN — FAMOTIDINE 10 MG: 10 TABLET, FILM COATED ORAL at 17:46

## 2024-10-08 RX ADMIN — ALBUMIN (HUMAN): 12.5 SOLUTION INTRAVENOUS at 12:19

## 2024-10-08 RX ADMIN — PHENYLEPHRINE HYDROCHLORIDE 0.7 MCG/KG/MIN: 10 INJECTION INTRAVENOUS at 12:35

## 2024-10-08 RX ADMIN — NOREPINEPHRINE BITARTRATE 12.8 MCG: 1 INJECTION, SOLUTION, CONCENTRATE INTRAVENOUS at 12:33

## 2024-10-08 RX ADMIN — MEPERIDINE HYDROCHLORIDE 12.5 MG: 25 INJECTION INTRAMUSCULAR; INTRAVENOUS; SUBCUTANEOUS at 14:05

## 2024-10-08 RX ADMIN — ACETAMINOPHEN 650 MG: 325 TABLET ORAL at 23:56

## 2024-10-08 RX ADMIN — AMPICILLIN SODIUM 2 G: 2 INJECTION, POWDER, FOR SOLUTION INTRAMUSCULAR; INTRAVENOUS at 06:09

## 2024-10-08 RX ADMIN — Medication 2 G: at 12:33

## 2024-10-08 RX ADMIN — ETOMIDATE 14 MG: 2 INJECTION, SOLUTION INTRAVENOUS at 12:25

## 2024-10-08 RX ADMIN — METOCLOPRAMIDE 10 MG: 10 TABLET ORAL at 17:46

## 2024-10-08 RX ADMIN — AMPICILLIN SODIUM 2 G: 2 INJECTION, POWDER, FOR SOLUTION INTRAMUSCULAR; INTRAVENOUS at 00:17

## 2024-10-08 RX ADMIN — DULOXETINE HYDROCHLORIDE 120 MG: 60 CAPSULE, DELAYED RELEASE ORAL at 08:46

## 2024-10-08 RX ADMIN — FAMOTIDINE 10 MG: 10 TABLET, FILM COATED ORAL at 08:46

## 2024-10-08 ASSESSMENT — ACTIVITIES OF DAILY LIVING (ADL)
ADLS_ACUITY_SCORE: 19

## 2024-10-08 NOTE — PROGRESS NOTES
MD Notification    Notified Person: MD    Notified Person Name:Tom Loyd    Notification Date/Time  10/24 @6109    Notification Interaction: telephone    Purpose of Notification: Dr Loyd  called     Orders Received: Patient can take wheelchair to NICU via wheel chair.   Patient can sit at edge of bed.  Patient can not ambulate to the bathroom    Comments: Patient can visit her baby in NICU once she sits at edge of bed. She needs to by wheelchair.

## 2024-10-08 NOTE — LACTATION NOTE
Asked by bedside RN. Jessica is taking Montelukast Sodium (Singulair). Information that is given is from the [employer] Medications [employer] Mothers' Milk by Jj Mckeon 2021 edition. Medication listed on page 437.     Noted to be and L4- Limited Data- Possibly Hazardous. Specific concerns listed are the Pediatric concerns: None reported via milk. Infant Monitoring: Irritability, drowsiness, diarrhea. The FDA hs just added a black box warning over the pediatric use of montelukast, which included side effects such as serious behavior and mood-related change, and suicide in users.     Information copied and given to Jessica. Encouraged Jessica to bring with her to rounds and discuss with the NICU team. No further questions at this time.    Kiara Johnson RN, IBCLC

## 2024-10-08 NOTE — ANESTHESIA CARE TRANSFER NOTE
Patient: Jessica Sierra    Procedure: Procedure(s):  MANUAL EXTRACTION OF PLACENTA, DILATION AND CURETTAGE, UTERUS, WITH ULTRASOUND GUIDANCE       Diagnosis: Retained complete placenta [O73.0]  Diagnosis Additional Information: No value filed.    Anesthesia Type:   No value filed.     Note:    Oropharynx: oropharynx clear of all foreign objects and spontaneously breathing  Level of Consciousness: awake  Oxygen Supplementation: face mask    Independent Airway: airway patency satisfactory and stable  Dentition: dentition unchanged    Report to RN Given: handoff report given  Patient transferred to: PACU  Comments: Pt awake and able to verbalize needs. ALL monitors on and audible. Spontaneous respiration without difficulty. Pt denies pain. Report given to PACU RN. Pt tachycardic and being treated with Albumin. BP 97/56. MDA aware and managing patient in PACU with PACU RN.  Handoff Report: Identifed the Patient, Identified the Reponsible Provider, Reviewed the pertinent medical history, Discussed the surgical course, Reviewed Intra-OP anesthesia mangement and issues during anesthesia, Set expectations for post-procedure period and Allowed opportunity for questions and acknowledgement of understanding      Vitals:  Vitals Value Taken Time   BP 97/56 10/08/24 1324   Temp     Pulse 138 10/08/24 1329   Resp 16 10/08/24 1329   SpO2 100 % 10/08/24 1329   Vitals shown include unfiled device data.    Electronically Signed By: MADHAV Hernandez CRNA  October 8, 2024  1:29 PM

## 2024-10-08 NOTE — PROGRESS NOTES
S: Has been uncomfortable with contractions and had epidural placed.  Feeling better.  Notes pressure.      O: VSS, afebrile  Fetal Cat 1, CTX difficult to monitor  SVE 10/100/2  Russell in situ, removed.    Assessment: 33yo  at 33w4d with  premature rupture of membranes now in Stage 2  labor.  Plan: Begin oxytocin and start pushing.  NICU aware.  PPROM - BMZ x2 (10/6- 1730)              - Latency Abx Day 3/7 with Azithro/Amp              - s/p Tocolysis with nifedipine, discontinued yesterday per on-call MD (Dr Yao's) conversation with MFM              - s/p Neuromag yesterday              - GBS negative              - MFM curbside consult yesterday, not formally seen              - s/p NICU consult              - Type and Screen  Anxiety - Duloxetine  Hypothyroid - Synthroid  Asthma - Stable, no meds

## 2024-10-08 NOTE — PROVIDER NOTIFICATION
10/08/24 0805   Provider Notification   Provider Name/Title Dr Hennessy   Method of Notification Phone   Request Evaluate - Remote   Notification Reason Status Update     Provider would like an SVE.  Pt found to be completely dilated. Dr Loyd was on unit and double checked cervix. Room set up for delivery.Charge RN and NICU notified.

## 2024-10-08 NOTE — PROVIDER NOTIFICATION
10/07/24 2145   Provider Notification   Provider Name/Title Najma PERERA   Method of Notification In Department   Request Evaluate - Remote   Notification Reason Status Update     Updated MD in department. OK not to check patient's cervix until tomorrow AM at induction or if patient kicks into labor, last checked at 0630 this AM. Continue with plan to monitor contractions overnight. Temp stable, BP stable. MD is 30 minutes away if needed for delivery.

## 2024-10-08 NOTE — OP NOTE
SURGEON: Tom Loyd MD  PREOPERATIVE DIAGNOSIS: Single intrauterine pregnancy at 33w4d,  premature rupture of membranes,  labor, retained placenta  POSTOPERATIVE DIAGNOSIS: Same, delivered, placenta accreta  OPERATION PERFORMED: Normal spontaneous vaginal delivery, transfer to main operating room, manual extraction of placenta, and ultrasound guided uterine curettage  ANESTHESIA: Epidural converted to GETA  EBL: 2.5 L  FLUIDS: 600cc Lactated Ringers at 125 mL/hour, 2u packed red blood cells with massive transfusion protocol activated, 4 units of albumin, 1L oxytocin  URINE OUTPUT: 100cc with delivery, 55cc with Russell catheter during placental extractions  DRAINS: Russell catheter  SPECIMENS: Cord blood and placenta  COMPLICATIONS: Retained placenta, postpartum hemorrhage  FINDINGS: Viable female infant weighing 2130 grams with APGARs of 3 and 8 at 1 and 5 minutes, respectively.  CONDITION: Mother is stable in Main OR PACU, extubated.  Infant stable in the NICU in the immediate postpartum period.     INDICATIONS: The patient is a 34 year old  who presented at 33w2d estimated gestational age with  premature rupture of membranes.  She was started on latency antibiotics and betamethasone as well as tocolysis during her steroid course.  Despite this, she progressed in labor and had an epidural placed.  With difficulty monitoring contractions, she silently progressed to complete dilation when I checked her this morning.  Her contractions were q9-10 minutes at this time.  She was started on oxytocin which was titrated every twenty minutes to 12u with pushing.    OPERATION: The patient progressed to complete/complete/+ 2 station and pushed for ~2.5 hours to deliver a viable female infant weighing 2130 grams with APGARs of 3 and 8 at one and five minutes, respectively via  over an intact perineum under epidural anesthesia. The baby delivered in right occiput anterior position. There was no  nuchal cord. The remainder of the body delivered without incident. Nose and mouth were bulb suctioned and the cord was clamped times two and cut after a 30 second delay. The baby was placed on the maternal abdomen then taken to the warmer for resuscitation by the awaiting and attentive NICU staff. I repaired a first degree and rigth labial laceration with 4-0 Vicryl.  Placenta did not deliver in thirty minutes.  I alerted nursing staff. I attempted to perform a manual extraction and the placenta remained attached.  A small handful of placental tissue sheared off without the placenta detaching.  I then made the decision to proceed to the Main OR for manual removal of retained placenta and curettage under ultrasound guidance.    I unscrubbed, made arrangement for immediate transfer to the OR, placed orders, and signed consent.  I went to the Main OR to brief the OR staff while the patient was being transferred.  During transport from L&D to the Main OR with L&D nursing staff, the patient rapidly decompensated and had a massive hemorrhage.  Upon entry to the Main OR, she was hypotensive and tachycardic.  Please see anesthesia notes for initial resuscitation, sedation, intubation, second IV placement, and activation of the massive transfusion protocol.    The patient was then placed in a dorsal lithotomy position. The patient was prepped and draped in the normal sterile fashion. A perioperative time out was performed.   I placed a Russell catheter.  Attention was turned to the vagina, where I performed a manual extraction of multiple large chunks of placenta.  Using ultrasound guidance, the rest of the placenta was able to then be grasped and removed.  I introduced a banjo curette under ultrasound guidance and curetted the uterus globally until a thin endometrial lining was seen.  Bleeding had subsided dramatically.    Inspection of the perineum revealed her first degree perineal laceration and right labial laceration  repair had re-opened.  This was again repaired with 4-0 Vicryl in routine fashion with excellent hemostasis. The rectum, sulci, and cervix were inspected and noted to be intact. The fundus was firm with IV oxytocin, tranexamic acid, cytotec rectally, and fundal massage. There were no sponges left in the vagina.  She was given Ancef and Azithro during this procedure when available.  She was stable, extubated, and transported to PACU in good condition with the massive transfusion protocol open with labs pending.

## 2024-10-08 NOTE — PROGRESS NOTES
Pt delivered a live female infant at 1123 over first degree laceration. Delivery team present due to prematurity. Placenta retained.  POC discussed with pt. Pt consented to manual removal in the OR. Charge RN notified.  Preop started but had to be sopped as pt noted to be bleeding more.   Assistance called for and pt was transferred to main OR via bed at 1215.

## 2024-10-08 NOTE — PROGRESS NOTES
Data: Jessica Sierra transferred to [facility] via cart at 1540. Baby in NICU.  Action: Receiving unit notified of transfer: Yes. Patient and family notified of room change. Report given to AUBREY Petersen at 1550. Belongings sent to receiving unit. Accompanied by Registered Nurse. Oriented patient to surroundings. Call light within reach.   Response: Patient tolerated transfer and is stable.

## 2024-10-08 NOTE — ANESTHESIA PREPROCEDURE EVALUATION
Anesthesia Pre-Procedure Evaluation    Patient: Jessica Sierra   MRN: [id number] : 1990        Procedure : Procedure(s):  MANUAL EXTRACTION OF PLACENTA, DILATION AND CURETTAGE, UTERUS, WITH ULTRASOUND GUIDANCE          Past Medical History:   Diagnosis Date    Anxiety     Irritable bowel syndrome     Migraine     Thyroid disease       Past Surgical History:   Procedure Laterality Date    septoplasty Bilateral       Allergies   Allergen Reactions    Lactose     Milk Protein     Milk-Related Compounds     Nitrofurantoin Hives    Nsaids Rash      Social History     Tobacco Use    Smoking status: Never    Smokeless tobacco: Never   Substance Use Topics    Alcohol use: Not Currently      Wt Readings from Last 1 Encounters:   10/06/24 70.3 kg (155 lb)        Anesthesia Evaluation   Pt has had prior anesthetic.     No history of anesthetic complications       ROS/MED HX  ENT/Pulmonary:    (-) asthma   Neurologic:       Cardiovascular:    (-) PIH   METS/Exercise Tolerance:     Hematologic:     (+)    no thrombocytopenia,  anemia,          Musculoskeletal:       GI/Hepatic:    (-) GERD   Renal/Genitourinary:       Endo:     (+)          thyroid problem, hypothyroidism,           Psychiatric/Substance Use:       Infectious Disease:       Malignancy:       Other:            Physical Exam    Airway        Mallampati: II   TM distance: > 3 FB   Neck ROM: full   Mouth opening: > 3 cm    Respiratory Devices and Support         Dental  no notable dental history         Cardiovascular   cardiovascular exam normal          Pulmonary   pulmonary exam normal                OUTSIDE LABS:  CBC:   Lab Results   Component Value Date    WBC 17.5 (H) 10/08/2024    WBC 18.2 (H) 10/07/2024    HGB 8.4 (L) 10/08/2024    HGB 10.3 (L) 10/08/2024    HCT 31.8 (L) 10/08/2024    HCT 32.6 (L) 10/07/2024     10/08/2024     10/08/2024     BMP:   Lab Results   Component Value Date    POTASSIUM 4.5 10/08/2024     COAGS:   Lab  "Results   Component Value Date    PTT 28 10/08/2024    INR 1.22 (H) 10/08/2024    FIBR 337 10/08/2024     POC: No results found for: \"BGM\", \"HCG\", \"HCGS\"  HEPATIC: No results found for: \"ALBUMIN\", \"PROTTOTAL\", \"ALT\", \"AST\", \"GGT\", \"ALKPHOS\", \"BILITOTAL\", \"BILIDIRECT\", \"LACEY\"  OTHER:   Lab Results   Component Value Date    A1C 5.1 2024    TSH 2.55 2024       Anesthesia Plan    ASA Status:  2, emergent       Anesthesia Type: General.              Consents    Anesthesia Plan(s) and associated risks, benefits, and realistic alternatives discussed. Questions answered and patient/representative(s) expressed understanding.     - Discussed:     - Discussed with:  Patient            Postoperative Care            Comments:    Other Comments:  35yo  at 33w4d with  premature rupture of membranes who now presents to the OR for retained placenta and hemorrhage.            Rebeca Faith MD    I have reviewed the pertinent notes and labs in the chart from the past 30 days and (re)examined the patient.  Any updates or changes from those notes are reflected in this note.           # Coagulation Defect: INR = 1.22 (Ref range: 0.85 - 1.15) and/or PTT = 28 Seconds (Ref range: 22 - 38 Seconds), will monitor for bleeding         #Post Partum Hemorrhage: 2379 mL, will continue to monitor and treat/transfuse as appropriate.    #Acute blood loss anemia: Lowest Hgb this hospitalization: 8.4 g/dL. Will continue to monitor and treat/transfuse as appropriate.               "

## 2024-10-08 NOTE — ANESTHESIA POSTPROCEDURE EVALUATION
Patient: Jessica Sierra    Procedure: Procedure(s):  MANUAL EXTRACTION OF PLACENTA, DILATION AND CURETTAGE, UTERUS, WITH ULTRASOUND GUIDANCE       Anesthesia Type:  General    Note:  Disposition: Inpatient   Postop Pain Control: Uneventful            Sign Out: Well controlled pain   PONV: No   Neuro/Psych: Uneventful            Sign Out: Acceptable/Baseline neuro status   Airway/Respiratory: Uneventful            Sign Out: Acceptable/Baseline resp. status   CV/Hemodynamics: Uneventful            Sign Out: Acceptable CV status; No obvious hypovolemia; No obvious fluid overload   Other NRE: NONE   DID A NON-ROUTINE EVENT OCCUR? No           Last vitals:  Vitals Value Taken Time   /76 10/08/24 1534   Temp 37.6  C (99.6  F) 10/08/24 1530   Pulse 102 10/08/24 1539   Resp 9 10/08/24 1539   SpO2 100 % 10/08/24 1539   Vitals shown include unfiled device data.    Electronically Signed By: Rebeca Faith MD  October 8, 2024  4:08 PM

## 2024-10-08 NOTE — OR NURSING
Transfer criteria met.  Dr Franklin at bedside; order received to transfer to Post-Partum nursing Care for continued recovery.  To room 406 per cart.

## 2024-10-08 NOTE — ANESTHESIA PROCEDURE NOTES
Airway       Patient location: [facility] - Operating Room or Procedural Area.       Procedure Start/Stop Times: 10/8/2024 12:28 PM  Staff -        Anesthesiologist:  Mark Buenrostro MD       CRNA: Yarely García APRN CRNA       Performed By: CRNAIndications and Patient Condition       Indications for airway management: vu-procedural and airway protection       Induction type:RSI       Mask difficulty assessment: 0 - not attempted    Final Airway Details       Final airway type: endotracheal airway       Successful airway: ETT - single  Endotracheal Airway Details        ETT size (mm): 7.0       Cuffed: yes       Successful intubation technique: video laryngoscopy       VL Blade Size: Wayne 3       Grade View of Cords: 1       Adjucts: stylet       Measured from: lips       Secured at (cm): 21       Bite block used: None    Post intubation assessment        Placement verified by: capnometry, equal breath sounds and chest rise        Number of other approaches attempted: 0       Secured with: tape       Ease of procedure: easy       Dentition: Intact and Unchanged    Medication(s) Administered   Medication Administration Time: 10/8/2024 12:28 PM

## 2024-10-08 NOTE — PLAN OF CARE
VSS on RA. Pt slept comfortably through the night. Received 00 and 06 doses of ampicillin. Epidural set at 6 ml/hr, pt was uncomfortable earlier in the night and pressed her bolus button once, has not felt her contractions since. Baby category I, baseline HR 120s-130s, moderate variability, + accels, - decels. Neto irregularly, q2-10 minutes on average. No SVE since 10/7 at 0630 per MD. Continuing to leak small amounts of clear fluid, no fever. Denies headache, epigastric pain, visual disturbances, swelling in her hands and feet. Excellent hydration and UOP. Current plan is for daytime MD to assess, presumed to start induction process with pitocin based on SVE.  Preben present and supportive at bedside. Continuing with POC.

## 2024-10-09 LAB
ERYTHROCYTE [DISTWIDTH] IN BLOOD BY AUTOMATED COUNT: 14.8 % (ref 10–15)
HCT VFR BLD AUTO: 27.1 % (ref 35–47)
HGB BLD-MCNC: 9 G/DL (ref 11.7–15.7)
MCH RBC QN AUTO: 29.4 PG (ref 26.5–33)
MCHC RBC AUTO-ENTMCNC: 33.2 G/DL (ref 31.5–36.5)
MCV RBC AUTO: 89 FL (ref 78–100)
PLATELET # BLD AUTO: 133 10E3/UL (ref 150–450)
RBC # BLD AUTO: 3.06 10E6/UL (ref 3.8–5.2)
WBC # BLD AUTO: 16.2 10E3/UL (ref 4–11)

## 2024-10-09 PROCEDURE — 85014 HEMATOCRIT: CPT | Performed by: STUDENT IN AN ORGANIZED HEALTH CARE EDUCATION/TRAINING PROGRAM

## 2024-10-09 PROCEDURE — 36415 COLL VENOUS BLD VENIPUNCTURE: CPT | Performed by: STUDENT IN AN ORGANIZED HEALTH CARE EDUCATION/TRAINING PROGRAM

## 2024-10-09 PROCEDURE — 120N000012 HC R&B POSTPARTUM

## 2024-10-09 PROCEDURE — 258N000003 HC RX IP 258 OP 636: Performed by: STUDENT IN AN ORGANIZED HEALTH CARE EDUCATION/TRAINING PROGRAM

## 2024-10-09 PROCEDURE — 250N000013 HC RX MED GY IP 250 OP 250 PS 637: Performed by: STUDENT IN AN ORGANIZED HEALTH CARE EDUCATION/TRAINING PROGRAM

## 2024-10-09 PROCEDURE — 250N000013 HC RX MED GY IP 250 OP 250 PS 637: Performed by: OBSTETRICS & GYNECOLOGY

## 2024-10-09 PROCEDURE — 250N000011 HC RX IP 250 OP 636: Performed by: STUDENT IN AN ORGANIZED HEALTH CARE EDUCATION/TRAINING PROGRAM

## 2024-10-09 RX ORDER — NALOXONE HYDROCHLORIDE 0.4 MG/ML
0.4 INJECTION, SOLUTION INTRAMUSCULAR; INTRAVENOUS; SUBCUTANEOUS
Status: DISCONTINUED | OUTPATIENT
Start: 2024-10-09 | End: 2024-10-11 | Stop reason: HOSPADM

## 2024-10-09 RX ORDER — SIMETHICONE 80 MG
80 TABLET,CHEWABLE ORAL EVERY 6 HOURS PRN
Status: DISCONTINUED | OUTPATIENT
Start: 2024-10-09 | End: 2024-10-11 | Stop reason: HOSPADM

## 2024-10-09 RX ORDER — NALOXONE HYDROCHLORIDE 0.4 MG/ML
0.2 INJECTION, SOLUTION INTRAMUSCULAR; INTRAVENOUS; SUBCUTANEOUS
Status: DISCONTINUED | OUTPATIENT
Start: 2024-10-09 | End: 2024-10-11 | Stop reason: HOSPADM

## 2024-10-09 RX ORDER — POLYETHYLENE GLYCOL 3350 17 G/17G
17 POWDER, FOR SOLUTION ORAL DAILY PRN
Status: DISCONTINUED | OUTPATIENT
Start: 2024-10-09 | End: 2024-10-11 | Stop reason: HOSPADM

## 2024-10-09 RX ADMIN — METOCLOPRAMIDE 10 MG: 10 TABLET ORAL at 12:59

## 2024-10-09 RX ADMIN — MONTELUKAST 10 MG: 10 TABLET, FILM COATED ORAL at 18:57

## 2024-10-09 RX ADMIN — FAMOTIDINE 10 MG: 10 TABLET, FILM COATED ORAL at 07:56

## 2024-10-09 RX ADMIN — ACETAMINOPHEN 650 MG: 325 TABLET ORAL at 04:14

## 2024-10-09 RX ADMIN — METOCLOPRAMIDE 10 MG: 10 TABLET ORAL at 17:13

## 2024-10-09 RX ADMIN — ACETAMINOPHEN 650 MG: 325 TABLET ORAL at 08:35

## 2024-10-09 RX ADMIN — FAMOTIDINE 10 MG: 10 TABLET, FILM COATED ORAL at 17:14

## 2024-10-09 RX ADMIN — DULOXETINE HYDROCHLORIDE 120 MG: 60 CAPSULE, DELAYED RELEASE ORAL at 08:35

## 2024-10-09 RX ADMIN — IRON SUCROSE 300 MG: 20 INJECTION, SOLUTION INTRAVENOUS at 13:13

## 2024-10-09 RX ADMIN — SIMETHICONE 80 MG: 80 TABLET, CHEWABLE ORAL at 19:34

## 2024-10-09 RX ADMIN — ACETAMINOPHEN 650 MG: 325 TABLET ORAL at 21:10

## 2024-10-09 RX ADMIN — ACETAMINOPHEN 650 MG: 325 TABLET ORAL at 17:13

## 2024-10-09 RX ADMIN — METOCLOPRAMIDE 10 MG: 10 TABLET ORAL at 07:57

## 2024-10-09 RX ADMIN — ACETAMINOPHEN 650 MG: 325 TABLET ORAL at 12:59

## 2024-10-09 RX ADMIN — PRENATAL VITAMINS-IRON FUMARATE 27 MG IRON-FOLIC ACID 0.8 MG TABLET 1 TABLET: at 07:57

## 2024-10-09 RX ADMIN — POLYETHYLENE GLYCOL 3350 17 G: 17 POWDER, FOR SOLUTION ORAL at 08:58

## 2024-10-09 RX ADMIN — SIMETHICONE 80 MG: 80 TABLET, CHEWABLE ORAL at 08:58

## 2024-10-09 RX ADMIN — LEVOTHYROXINE SODIUM 100 MCG: 100 TABLET ORAL at 07:56

## 2024-10-09 ASSESSMENT — ACTIVITIES OF DAILY LIVING (ADL)
ADLS_ACUITY_SCORE: 19

## 2024-10-09 NOTE — PLAN OF CARE
Goal Outcome Evaluation:      Plan of Care Reviewed With: patient, spouse    Overall Patient Progress: improvingOverall Patient Progress: improving     Vital signs stable. Postpartum assessment WDL. Pain controlled with  Tylenol . Also using ice .  Patient voiding without difficulty.  Pumping every 3 to 4 hours. Visiting baby in NICU via wheelchair. Started on IV iron Hgb 9.0. Denies dizziness or lightheaded. Patient and infant bonding well. Will continue with current plan of care.

## 2024-10-09 NOTE — PLAN OF CARE
Goal Outcome Evaluation:      Plan of Care Reviewed With: patient, spouse      Vital signs stable. Postpartum assessment WDL. Pain controlled with Tylenol. Patient  has a gunter in place until am. See MD notification.    Explained the Breast  pump and to pump every 3 to 4 hours because baby is in NIUC. Patient is SLOC . Patient has  good urine out  put in the gunter. Tolerating regular diet and drinking plenty of fluids.   Will continue with current plan of care. Patient will  be on bedrest except see order patient can sit at bedside and take the wheel chair to NICU when stable. VSS . Hgb in the am.

## 2024-10-09 NOTE — PROGRESS NOTES
MD Notification    Notified Person: MD    Notified Person Name:Marisol Hennessy    Notification Date/Time:10/9/24@0819    Notification Interaction: telephone    Purpose of Notification: simethicone    Orders Received:Simethicone prn    Comments:

## 2024-10-09 NOTE — PLAN OF CARE
Goal Outcome Evaluation:      Plan of Care Reviewed With: patient, spouse    Overall Patient Progress: improvingOverall Patient Progress: improving     Vital signs stable. Afebrile. Patient blood pressure starting to increase and heart rate dropping to a normal range overnight. Patient was hypotensive and tachycardic. Postpartum assessment WDL. Fundus is firm with scant lochia. Pain controlled with tylenol. Russell catheter in place overnight per order. Urine output is adequate, urine is clear/pale yellow. Patient initiated pumping overnight. Patient denies any lightheadedness, dizziness or headache overnight. Patient is looking forward to going downstairs to see infant in NICU today. Will continue with current plan of care.

## 2024-10-09 NOTE — LACTATION NOTE
This note was copied from a baby's chart.  Taking care of this family in NICU today and offered lactat\ion support.Baby 33 5/7 weeks today. Mom brought down 7 mls EBM and reinforced to aim to pump 8 times/day. Discussed normal milk initiation and gave parents the expected pumping volumes sheet as well as information on pumping apps to use as desired. Also referred her to the breastfeeding section in the NICU booklet she they received on admission. Discussed getting a hands free pumping bra and the availability of renting a hospital grade pump if her one at home does not produce as much. Mom held skin to skin during feeding. Reinforced that small amounts or even drops are normal the first 2-3 days after delivery. Mom appeared grateful for the support and information.    TRAVIS Kauffman RNC, IBCLC

## 2024-10-09 NOTE — PROGRESS NOTES
"Post Partum Progress Note  [facility]    Jessica Sierra MRN# [id number]  Age: 34 year old  YOB: 1990      Post Partum Day: 1    Subjective:   Patient seen and examined at bedside. Doing well. Pain is well controlled on current regimen. Lochia is minimal. Has not ambulated yet. UOP appropriate overnight. Will remove gunter this AM. Tolerating regular diet without nausea or vomiting. She is attempting to pump and breastfeed baby in NICU and understands lactation consult available at her request. Reports baby otherwise doing well in NICU. Denies HA, vision changes, CP, SOB, upper abdominal pain, calf pain, dizziness, lightheadedness.       Objective:  /69 (BP Location: Left arm, Patient Position: Semi-Colunga's, Cuff Size: Adult Regular)   Pulse 102   Temp 98.1  F (36.7  C) (Oral)   Resp 16   Ht 1.791 m (5' 10.5\")   Wt 70.3 kg (155 lb)   SpO2 100%   BMI 21.93 kg/m      Constitutional: healthy, alert, and no distress  Respiration: Unlabored respiration  Cardiac: Regular rate  Abdomen:  Uterine fundus is firm, non-tender and at the level of the umbilicus. Appropriately tender to palpation. Non-distended, no guarding.    Extremities: no edema, redness or tenderness in the calves or thighs    Labs: Pending this AM     Assessment/Plan:   1. Post-partum day #1 s/p  with retained placenta requiring transfer to main OR, exam under anesthesia with manual extraction of placenta and ultrasound guided uterine curettage (now POD#1). Pregnancy complicated by PPROM, IVF pregnancy, Hypothyroidism, anxiety/fibromyalgia, asthma.   - Doing well. Continue routine postoperative/postpartum care  - Abdominal binder prn.  UOP appropriate. Will remove gunter this AM and await spontaneous void. Await flatus/bowel movement. Encouraged ambulation, hydration and stool softeners.   - Encouraged continued breastfeeding attempts and pumping. Lactation consult prn.  - Anticipate discharge " POD#2-3 pending meeting milestones    2. Postpartum hemorrhage; acute blood loss anemia  - Hgb trend: 12.7 (on arrival) - 10.3 (intra-op) - 2u pRBC - 8.4 (postop) - AM CBC pending  - S/p 2u pRBC, TXA, Pitocin   - Asymtpomatic, VSS. Will plan for IV Venofer while inpt and continue to monitor closely    3. Hypothyroidism  - Continue synthroid    4. Anxiety/Fibromyalgia  -  Cont Duloxetine    5. Asthma - stable. Cont to monitor      Marisol Hennessy, DO

## 2024-10-09 NOTE — ANESTHESIA POSTPROCEDURE EVALUATION
Patient: Jessica Sierra    Procedure: * No procedures listed *       Anesthesia Type:  Epidural    Note:  Disposition: Inpatient   Postop Pain Control: Uneventful            Sign Out: Well controlled pain   PONV: No   Neuro/Psych: Uneventful            Sign Out: Acceptable/Baseline neuro status   Airway/Respiratory: Uneventful            Sign Out: Acceptable/Baseline resp. status   CV/Hemodynamics: Uneventful            Sign Out: Acceptable CV status   Other NRE: NONE   DID A NON-ROUTINE EVENT OCCUR? No           Last vitals:  Vitals Value Taken Time   BP     Temp     Pulse     Resp     SpO2         Electronically Signed By: Daljit Gan MD  October 9, 2024  5:45 AM

## 2024-10-10 LAB
HGB BLD-MCNC: 10.5 G/DL (ref 11.7–15.7)
PATH REPORT.COMMENTS IMP SPEC: NORMAL
PATH REPORT.COMMENTS IMP SPEC: NORMAL
PATH REPORT.FINAL DX SPEC: NORMAL
PATH REPORT.GROSS SPEC: NORMAL
PATH REPORT.MICROSCOPIC SPEC OTHER STN: NORMAL
PATH REPORT.RELEVANT HX SPEC: NORMAL
PHOTO IMAGE: NORMAL

## 2024-10-10 PROCEDURE — 258N000003 HC RX IP 258 OP 636: Performed by: STUDENT IN AN ORGANIZED HEALTH CARE EDUCATION/TRAINING PROGRAM

## 2024-10-10 PROCEDURE — 120N000012 HC R&B POSTPARTUM

## 2024-10-10 PROCEDURE — 250N000013 HC RX MED GY IP 250 OP 250 PS 637: Performed by: OBSTETRICS & GYNECOLOGY

## 2024-10-10 PROCEDURE — 250N000013 HC RX MED GY IP 250 OP 250 PS 637: Performed by: STUDENT IN AN ORGANIZED HEALTH CARE EDUCATION/TRAINING PROGRAM

## 2024-10-10 PROCEDURE — 36415 COLL VENOUS BLD VENIPUNCTURE: CPT | Performed by: PHYSICIAN ASSISTANT

## 2024-10-10 PROCEDURE — 85018 HEMOGLOBIN: CPT | Performed by: PHYSICIAN ASSISTANT

## 2024-10-10 PROCEDURE — 250N000011 HC RX IP 250 OP 636: Performed by: STUDENT IN AN ORGANIZED HEALTH CARE EDUCATION/TRAINING PROGRAM

## 2024-10-10 RX ADMIN — IRON SUCROSE 300 MG: 20 INJECTION, SOLUTION INTRAVENOUS at 09:36

## 2024-10-10 RX ADMIN — MONTELUKAST 10 MG: 10 TABLET, FILM COATED ORAL at 17:28

## 2024-10-10 RX ADMIN — SIMETHICONE 80 MG: 80 TABLET, CHEWABLE ORAL at 17:27

## 2024-10-10 RX ADMIN — ACETAMINOPHEN 650 MG: 325 TABLET ORAL at 11:51

## 2024-10-10 RX ADMIN — LEVOTHYROXINE SODIUM 100 MCG: 100 TABLET ORAL at 08:02

## 2024-10-10 RX ADMIN — FAMOTIDINE 10 MG: 10 TABLET, FILM COATED ORAL at 08:02

## 2024-10-10 RX ADMIN — POLYETHYLENE GLYCOL 3350 17 G: 17 POWDER, FOR SOLUTION ORAL at 08:07

## 2024-10-10 RX ADMIN — SIMETHICONE 80 MG: 80 TABLET, CHEWABLE ORAL at 08:07

## 2024-10-10 RX ADMIN — PRENATAL VITAMINS-IRON FUMARATE 27 MG IRON-FOLIC ACID 0.8 MG TABLET 1 TABLET: at 08:02

## 2024-10-10 RX ADMIN — ACETAMINOPHEN 650 MG: 325 TABLET ORAL at 08:02

## 2024-10-10 RX ADMIN — ACETAMINOPHEN 650 MG: 325 TABLET ORAL at 04:38

## 2024-10-10 RX ADMIN — METOCLOPRAMIDE 10 MG: 10 TABLET ORAL at 08:02

## 2024-10-10 RX ADMIN — ACETAMINOPHEN 650 MG: 325 TABLET ORAL at 17:28

## 2024-10-10 RX ADMIN — ACETAMINOPHEN 650 MG: 325 TABLET ORAL at 21:58

## 2024-10-10 RX ADMIN — DULOXETINE HYDROCHLORIDE 120 MG: 60 CAPSULE, DELAYED RELEASE ORAL at 08:02

## 2024-10-10 RX ADMIN — METOCLOPRAMIDE 10 MG: 10 TABLET ORAL at 11:51

## 2024-10-10 RX ADMIN — FAMOTIDINE 10 MG: 10 TABLET, FILM COATED ORAL at 17:27

## 2024-10-10 ASSESSMENT — ACTIVITIES OF DAILY LIVING (ADL)
ADLS_ACUITY_SCORE: 18
ADLS_ACUITY_SCORE: 19
ADLS_ACUITY_SCORE: 18
ADLS_ACUITY_SCORE: 19
ADLS_ACUITY_SCORE: 18
ADLS_ACUITY_SCORE: 19

## 2024-10-10 NOTE — PLAN OF CARE
Goal Outcome Evaluation:      Plan of Care Reviewed With: patient, spouse    Overall Patient Progress: improvingOverall Patient Progress: improving         Fundus firm and bleeding wnl.  VSS.  Voiding without difficulty.  Rates pain 2/10 and taking tylenol with good relief.  Stat hemoglobin was 10.5- Received IV iron and tolerated well, IV removed.  Up ambulating independently and visiting infant in the NICU, pumping.  Encouraged to call with questions or concerns.  Will continue to monitor.

## 2024-10-10 NOTE — PROGRESS NOTES
[facility]       DAILY NOTE - POSTPARTUM DAY 2     SUBJECTIVE:     Pain controlled? Yes  Tolerating a regular diet? YES  Ambulating? YES  Voiding without difficulty? Yes    PPD2 . PP course complicated by PP hemorrhage.  Doing very well.  Ambulating well.  Denies dizziness lightheadedness.  Bleeding starting to taper.  Pain well controlled.  No bladder or bowel concerns.  Milk is starting to come in.  Baby girl Lexus doing well in NICU.    OBJECTIVE:  Vitals:    10/09/24 1454 10/09/24 1618 10/10/24 0020 10/10/24 0802   BP: 98/66 100/76 103/70 103/73   BP Location:  Left arm Left arm    Patient Position:   Semi-Colunga's    Cuff Size: Adult Regular  Adult Regular    Pulse:  100 106 104   Resp: 16 16 16 16   Temp: 98  F (36.7  C) 98  F (36.7  C) 97.9  F (36.6  C) 97.5  F (36.4  C)   TempSrc: Oral Oral Oral Oral   SpO2:       Weight:       Height:           Constitutional: healthy, alert, and no distress    Abdomen:  Uterine fundus is firm, non-tender and at the level of the umbilicus     Lower extremities: no edema      LABS:  Hemoglobin   Date Value Ref Range Status   10/10/2024 10.5 (L) 11.7 - 15.7 g/dL Final   10/09/2024 9.0 (L) 11.7 - 15.7 g/dL Final       ASSESSMENT:  Post-partum day #2 s/p   Pregnancy complicated by IVF pregnancy, PPROM, retained placenta, PP hemorrhage, hypothyroid, anxiety  Acute blood loss anemia    Doing well.       PLAN:   Continue routine postpartum cares  Plan for discharge tomorrow  Hbg stable, IV venofer today  Continue synthroid  Continue duloxetine  Encourage ambulation     Mili Li PA-C

## 2024-10-10 NOTE — PLAN OF CARE
Goal Outcome Evaluation:      Plan of Care Reviewed With: patient    Overall Patient Progress: improvingOverall Patient Progress: improving    Vital signs stable. Postpartum assessment WDL. Pain controlled with tylenol. Patient up ad annabel and voiding without difficulty. Patient pumping every 3-4 hours overnight and getting approximately 20mls. Plan of care ongoing.

## 2024-10-11 VITALS
TEMPERATURE: 98.2 F | RESPIRATION RATE: 16 BRPM | HEART RATE: 115 BPM | HEIGHT: 71 IN | DIASTOLIC BLOOD PRESSURE: 67 MMHG | OXYGEN SATURATION: 100 % | SYSTOLIC BLOOD PRESSURE: 97 MMHG | WEIGHT: 143.52 LBS | BODY MASS INDEX: 20.09 KG/M2

## 2024-10-11 PROBLEM — E03.9 ACQUIRED HYPOTHYROIDISM: Status: ACTIVE | Noted: 2023-08-17

## 2024-10-11 PROBLEM — F41.9 ANXIETY: Status: ACTIVE | Noted: 2023-08-17

## 2024-10-11 PROBLEM — F51.04 CHRONIC INSOMNIA: Status: ACTIVE | Noted: 2023-08-17

## 2024-10-11 PROBLEM — J45.20 MILD INTERMITTENT ASTHMA WITHOUT COMPLICATION: Chronic | Status: ACTIVE | Noted: 2019-01-23

## 2024-10-11 PROBLEM — K58.9 IRRITABLE BOWEL SYNDROME: Status: ACTIVE | Noted: 2023-08-17

## 2024-10-11 PROBLEM — M79.7 FIBROMYALGIA: Status: ACTIVE | Noted: 2023-08-17

## 2024-10-11 PROCEDURE — 250N000013 HC RX MED GY IP 250 OP 250 PS 637: Performed by: STUDENT IN AN ORGANIZED HEALTH CARE EDUCATION/TRAINING PROGRAM

## 2024-10-11 PROCEDURE — 250N000013 HC RX MED GY IP 250 OP 250 PS 637: Performed by: OBSTETRICS & GYNECOLOGY

## 2024-10-11 RX ORDER — ACETAMINOPHEN 500 MG
1000 TABLET ORAL EVERY 6 HOURS PRN
Qty: 30 TABLET | Refills: 0 | Status: SHIPPED | OUTPATIENT
Start: 2024-10-11

## 2024-10-11 RX ORDER — DOCUSATE SODIUM 100 MG/1
100 CAPSULE, LIQUID FILLED ORAL 2 TIMES DAILY PRN
Qty: 30 CAPSULE | Refills: 0 | Status: SHIPPED | OUTPATIENT
Start: 2024-10-11

## 2024-10-11 RX ADMIN — ACETAMINOPHEN 650 MG: 325 TABLET ORAL at 02:36

## 2024-10-11 RX ADMIN — ACETAMINOPHEN 650 MG: 325 TABLET ORAL at 06:54

## 2024-10-11 RX ADMIN — POLYETHYLENE GLYCOL 3350 17 G: 17 POWDER, FOR SOLUTION ORAL at 08:28

## 2024-10-11 RX ADMIN — ACETAMINOPHEN 650 MG: 325 TABLET ORAL at 12:14

## 2024-10-11 RX ADMIN — SIMETHICONE 80 MG: 80 TABLET, CHEWABLE ORAL at 08:28

## 2024-10-11 RX ADMIN — DULOXETINE HYDROCHLORIDE 120 MG: 60 CAPSULE, DELAYED RELEASE ORAL at 08:28

## 2024-10-11 RX ADMIN — PRENATAL VITAMINS-IRON FUMARATE 27 MG IRON-FOLIC ACID 0.8 MG TABLET 1 TABLET: at 08:29

## 2024-10-11 RX ADMIN — FAMOTIDINE 10 MG: 10 TABLET, FILM COATED ORAL at 08:28

## 2024-10-11 RX ADMIN — LEVOTHYROXINE SODIUM 100 MCG: 100 TABLET ORAL at 08:29

## 2024-10-11 ASSESSMENT — ACTIVITIES OF DAILY LIVING (ADL)
ADLS_ACUITY_SCORE: 18

## 2024-10-11 NOTE — PLAN OF CARE
Goal Outcome Evaluation:      Plan of Care Reviewed With: patient    Overall Patient Progress: improvingOverall Patient Progress: improving    Vital signs stable and assessments WDL. Patient is up independently, voiding without difficulty, pain is controlled with tylenol. Encouraged to continue to ambulate as able and void frequently. Pumping for baby in NICU, getting good amounts, switching to maintenance mode.  at bedside and supportive of patient.

## 2024-10-11 NOTE — PROGRESS NOTES
[facility]       DAILY NOTE - POSTPARTUM DAY 3    SUBJECTIVE:     Pain controlled? Yes  Tolerating a regular diet? YES  Ambulating? YES  Voiding without difficulty? Yes    PPD3 . PP course complicated by PP hemorrhage and surgical intervention with manual removal of placenta.  Doing very well today.  Ambulating well.  Denies dizziness or lightheadedness.  Bleeding minimal.  Pain well controlled.  No bladder or bowel concerns.  Milk is starting to come in.  Baby girl Lexus doing well in NICU.    OBJECTIVE:  Vitals:    10/10/24 0802 10/10/24 1725 10/11/24 0215 10/11/24 0749   BP: 103/73 104/72 100/70 97/67   BP Location:   Left arm Left arm   Patient Position:   Semi-Colunga's    Cuff Size:   Adult Regular    Pulse: 104 100 69 115   Resp: 16 16 16 16   Temp: 97.5  F (36.4  C) 98  F (36.7  C) 97.7  F (36.5  C) 98.2  F (36.8  C)   TempSrc: Oral Oral Oral Oral   SpO2:       Weight:    65.1 kg (143 lb 8.3 oz)   Height:           Constitutional: healthy, alert, and no distress  Respiration: Unlabored respiration  Cardiac: Regular rate  Abdomen:  Uterine fundus is firm, non-tender and at the level of the umbilicus. Appropriately tender to palpation. Non-distended, no guarding.    Extremities: no edema, redness or tenderness in the calves or thighs    LABS:  Hemoglobin   Date Value Ref Range Status   10/10/2024 10.5 (L) 11.7 - 15.7 g/dL Final   10/09/2024 9.0 (L) 11.7 - 15.7 g/dL Final       ASSESSMENT/PLAN:  Post-partum day #3 s/p  with retained placenta requiring transfer to main OR, exam under anesthesia with manual extraction of placenta and ultrasound guided uterine curettage (now POD#3). Pregnancy complicated by PPROM, IVF pregnancy, Hypothyroidism, anxiety/fibromyalgia, asthma.   - Doing well. Continue routine postoperative/postpartum care  - Abdominal binder prn.  UOP appropriate and reports spontaneous void and flatus/bowel movement. Encouraged ambulation, hydration and stool softeners.   - Encouraged  continued breastfeeding attempts and pumping. Lactation consult prn.  - Anticipate discharge today as meeting milestones     2. Postpartum hemorrhage; acute blood loss anemia  - Hgb trend: 12.7 (on arrival) - 10.3 (intra-op) - 2u pRBC - 8.4 (immediate postop) - 9.0 - 10.5  - S/p 2u pRBC, TXA, Pitocin   - Asymtpomatic, VSS. Will plan for IV Venofer while inpt and continue to monitor closely     3. Hypothyroidism  - Continue synthroid     4. Anxiety/Fibromyalgia  -  Cont Duloxetine     5. Asthma - stable. Cont to monitor    Marisol Hennessy, DO  Obstetrics and Gynecology

## 2024-10-11 NOTE — DISCHARGE INSTRUCTIONS
Below are some over-the-counter medications you are encouraged to utilize postpartum:     Pain medications:   Ibuprofen 600mg every 6 hours  Tylenol 1000mg every 6-8 hours    Stool softeners:   Colace 100mg twice a day as needed  Miralax 1 capfull once a day as needed  Breastfeeding: Care Instructions  Breastfeeding (sometimes called chestfeeding) is a skill that gets easier with practice. Try to be patient with yourself and your baby. You're both learning how to breastfeed.    Breastfeeding has many benefits. It can help you bond with your baby. It may lower your baby's chances of getting an infection.   If you have trouble breastfeeding, talk to your doctor, midwife, or lactation consultant. Support can also come from a trusted friend or family member who knows how to breastfeed.         Eat a variety of foods.   Choose vegetables, fruits, milk products, whole grains, and proteins.        Try to limit or avoid certain things.   Limit alcohol. It can pass through breast milk to your baby.  Avoid smoking, vaping, marijuana, and other drugs.  Try to reduce caffeine if your baby is fussy and has problems with sleep.  Avoid fish high in mercury. These include shark, swordfish, sukh mackerel, marlin, orange roughy, and bigeye tuna, as well as tilefish from the [place].        Talk to your doctor about medicines and supplements.   Some can affect your breast milk or your baby.        Try different breastfeeding positions.   Find what works for you and your baby.  Different holds include cradle, cross-cradle, football, Australian, laid back, and side-lying.        Take care of yourself.   Take steps to prevent painful or cracked nipples.  Make sure your baby feeds with a good latch.  Ask for help if you're having pain while breastfeeding.  Try letting some breast milk dry on your nipples.  Rest when you can, and drink plenty of water. And ask for help if you need it.  When should you call for help?   Call your  "doctor now or seek immediate medical care if:    You have symptoms of a breast infection, such as:  Increased pain, swelling, redness, or warmth around a breast.  Red streaks extending from the breast.  Pus draining from a breast.  A fever.     Your baby has no wet diapers for 6 hours.   Watch closely for changes in your health, and be sure to contact your doctor if:    Your baby has trouble latching on to your breast.     You continue to have pain or discomfort when breastfeeding.     You have other questions or concerns.   Where can you learn more?  Go to https://www.[employer].net/patiented  Enter P492 in the search box to learn more about \"Breastfeeding: Care Instructions.\"  Current as of: July 10, 2023  Content Version: 142024 [employer].   Care instructions adapted under license by your healthcare professional. If you have questions about a medical condition or this instruction, always ask your healthcare professional. Healthwise, [email or web address] disclaims any warranty or liability for your use of this information.  Postpartum Care at Home With Your Baby: Care Instructions  Overview     After childbirth (postpartum period), your body goes through many changes as you recover. In these weeks after delivery, try to take good care of yourself. Get rest whenever you can and accept help from others.  It may take 4 to 6 weeks to feel like yourself again, and possibly longer if you had a  birth. You may feel sore or very tired as you recover. After delivery, you may continue to have contractions as the uterus returns to the size it was before your pregnancy. You will also have some vaginal bleeding. And you may have pain around the vagina as you heal. Several days after delivery you may also have pain and swelling in your breasts as they fill with milk. There are things you can do at home to help ease these discomforts.  After childbirth, it's common to feel emotional. You may feel irritable, " "cry easily, and feel happy one minute and sad the next. This is called the \"baby blues.\" Hormone changes are one cause of these emotional changes. These feelings usually get better within a couple of weeks. If they don't, talk to your doctor or midwife.  In the first couple of weeks after you give birth, your doctor or midwife may want to check in with you and make a plan for follow-up care. You will likely have a complete postpartum visit in the first 3 months after delivery. At that time, your doctor or midwife will check on your recovery and see how you're doing. But if you have questions or concerns before then, you can always call your doctor or midwife.  Follow-up care is a key part of your treatment and safety. Be sure to make and go to all appointments, and call your doctor if you are having problems. It's also a good idea to know your test results and keep a list of the medicines you take.  How can you care for yourself at home?  Taking care of your body  Use pads instead of tampons for bleeding. After birth, you will have bloody vaginal discharge. You may also pass some blood clots that shouldn't be bigger than an egg. Over the next 6 weeks or so, your bleeding should decrease a little every day and slowly change to a pinkish and then whitish discharge.  For cramps or mild pain, try an over-the-counter pain medicine, such as acetaminophen (Tylenol) or ibuprofen (Advil, Motrin). Read and follow all instructions on the label.  To ease pain around the vagina or from hemorrhoids:  Put ice or a cold pack on the area for 10 to 20 minutes at a time. Put a thin cloth between the ice and your skin.  Try sitting in a few inches of warm water (sitz bath) when you can or after bowel movements.  Clean yourself with a gentle squeeze of warm water from a bottle instead of wiping with toilet paper.  Use witch hazel or hemorrhoid pads (such as Tucks).  Try using a cold compress for sore and swollen breasts. And wear a " supportive bra that fits.  Ease constipation by drinking plenty of fluids and eating high-fiber foods. Ask your doctor or midwife about over-the-counter stool softeners.  Activity  Rest when you can.  Ask for help from family or friends when you need it.  If you can, have another adult in your home for at least 2 or 3 days after birth.  When you feel ready, try to get some exercise every day. For many people, walking is a good choice. Don't do any heavy exercise until your doctor or midwife says it's okay.  Ask your doctor or midwife when it is okay to have vaginal sex.  If you don't want to get pregnant, talk to your doctor or midwife about birth control options. You can get pregnant even before your period returns. Also, you can get pregnant while you are breastfeeding.  Talk to your doctor or midwife if you want to get pregnant again. They can talk to you about when it is safe.  Emotional health  It's normal to have some sadness, anxiety, and mood swings after delivery. It may help to talk with a trusted friend or family member. You can also call the Maternal Mental Health Hotline at 7-685-[facility] [place] (1-583.402.6498) for support. If these mood changes last more than a couple of weeks, talk to your doctor or midwife.  When should you call for help?  Share this information with your partner, family, or a friend. They can help you watch for warning signs.  Call 811  anytime you think you may need emergency care. For example, call if:    You feel you cannot stop from hurting yourself, your baby, or someone else.     You passed out (lost consciousness).     You have chest pain, are short of breath, or cough up blood.     You have a seizure.   Where to get help 24 hours a day, 7 days a week   If you or someone you know talks about suicide, self-harm, a mental health crisis, a substance use crisis, or any other kind of emotional distress, get help right away. You can:    Call the Suicide and Crisis Lifeline at 905.      Call 4-828-131-TALK (1-982.763.5568).     Text HOME to 847880 to access the Crisis Text Line.   Consider saving these numbers in your phone.  Go to [employer] for more information or to chat online.  Call your doctor or midwife now or seek immediate medical care if:    You have signs of hemorrhage (too much bleeding), such as:  Heavy vaginal bleeding. This means that you are soaking through one or more pads in an hour. Or you pass blood clots bigger than an egg.  Feeling dizzy or lightheaded, or you feel like you may faint.  Feeling so tired or weak that you cannot do your usual activities.  A fast or irregular heartbeat.  New or worse belly pain.     You have signs of infection, such as:  A fever.  Increased pain, swelling, warmth, or redness from an incision or wound.  Frequent or painful urination or blood in your urine.  Vaginal discharge that smells bad.  New or worse belly pain.     You have symptoms of a blood clot in your leg (called a deep vein thrombosis), such as:  Pain in the calf, back of the knee, thigh, or groin.  Swelling in the leg or groin.  A color change on the leg or groin. The skin may be reddish or purplish, depending on your usual skin color.     You have signs of preeclampsia, such as:  Sudden swelling of your face, hands, or feet.  New vision problems (such as dimness, blurring, or seeing spots).  A severe headache.     You have signs of heart failure, such as:  New or increased shortness of breath.  New or worse swelling in your legs, ankles, or feet.  Sudden weight gain, such as more than 2 to 3 pounds in a day or 5 pounds in a week.  Feeling so tired or weak that you cannot do your usual activities.     You had spinal or epidural pain relief and have:  New or worse back pain.  Increased pain, swelling, warmth, or redness at the injection site.  Tingling, weakness, or numbness in your legs or groin.   Watch closely for changes in your health, and be sure to contact your doctor or  "midwife if:    Your vaginal bleeding isn't decreasing.     You feel sad, anxious, or hopeless for more than a few days.     You are having problems with your breasts or breastfeeding.   Where can you learn more?  Go to https://www.[employer].net/patiented  Enter Z768 in the search box to learn more about \"Postpartum Care at Home With Your Baby: Care Instructions.\"  Current as of: July 10, 2023  Content Version: 14.2 2024 [facility] [place].   Care instructions adapted under license by your healthcare professional. If you have questions about a medical condition or this instruction, always ask your healthcare professional. Healthwise[employer] disclaims any warranty or liability for your use of this information.    "

## 2024-10-11 NOTE — PLAN OF CARE
Goal Outcome Evaluation:      Plan of Care Reviewed With: patient, spouse    Overall Patient Progress: improvingOverall Patient Progress: improving         D: VSS, assessments WDL.   I: Pt. received complete discharge paperwork and home medications as filled by discharge pharmacy.  Pt. was given times of last dose for all discharge medications in writing on discharge medication sheets.  Discharge teaching included home medication, pain management, activity restrictions, postpartum cares, and signs and symptoms of infection.    A: Discharge outcomes on care plan met.  Mother states understanding and comfort with self.   P: Pt. discharged to home. Pt. was accompanied by , nurse and left with personal belongings.  Pt. to follow up with OB per MD order.  Pt. had no further questions at the time of discharge and no unmet needs were identified.

## 2024-10-11 NOTE — LACTATION NOTE
Discharge visit with JessicaASHLEY Humberto, baby girl born at 33 weeks and receiving care in our NICU.    Jessica is pumping 8 times a day. Using our hospital grade Medela and she has transitioned to the Maintain Mode. Jessica pumping nice volume for day 3! Discussed pumping from 15-20 minutes based upon her body's response.  Jessica has a Unimom Opera for home use. Measured Jessica's nipples, 18mm and recommended using a 20-21mm flange. Encouraged her to try the medela 21mm with next pump session (she was using the 24mm).    Discussed infant development and breastfeeding expectations. Encouraged Jessica to continue to request lactation support as needed.    PRovided handouts for milk volume expectations and online support.    Ashley Menjivar RN IBCLC

## 2024-10-18 ENCOUNTER — LACTATION ENCOUNTER (OUTPATIENT)
Dept: OTHER | Facility: CLINIC | Age: 34
End: 2024-10-18

## 2024-10-18 NOTE — LACTATION NOTE
This note was copied from a baby's chart.  Taking care of this family in NICU and offered lactation support. Mom states she's pumping at least 600 mls/day. Reinforced she's doing well and that her milk supply should continue to increase as long as pumping continues. Attempt at breast at 1300 feeding using 20 mm shield. Mom handles baby well.  Lexus latched and took a few sucks before tiring. Lexus mostly sleepy at feeding times yet. Parents grateful for the support.    TRAVIS Kauffman RNC, IBCLC

## 2024-10-23 ENCOUNTER — LAB REQUISITION (OUTPATIENT)
Dept: LAB | Facility: CLINIC | Age: 34
End: 2024-10-23
Payer: COMMERCIAL

## 2024-10-23 DIAGNOSIS — D62 ACUTE POSTHEMORRHAGIC ANEMIA: ICD-10-CM

## 2024-10-23 DIAGNOSIS — E03.9 HYPOTHYROIDISM, UNSPECIFIED: ICD-10-CM

## 2024-10-23 LAB
BASOPHILS # BLD AUTO: 0 10E3/UL (ref 0–0.2)
BASOPHILS NFR BLD AUTO: 1 %
EOSINOPHIL # BLD AUTO: 0.1 10E3/UL (ref 0–0.7)
EOSINOPHIL NFR BLD AUTO: 2 %
ERYTHROCYTE [DISTWIDTH] IN BLOOD BY AUTOMATED COUNT: 14.2 % (ref 10–15)
HCT VFR BLD AUTO: 39.7 % (ref 35–47)
HGB BLD-MCNC: 12.2 G/DL (ref 11.7–15.7)
IMM GRANULOCYTES # BLD: 0 10E3/UL
IMM GRANULOCYTES NFR BLD: 0 %
LYMPHOCYTES # BLD AUTO: 1.8 10E3/UL (ref 0.8–5.3)
LYMPHOCYTES NFR BLD AUTO: 28 %
MCH RBC QN AUTO: 29.3 PG (ref 26.5–33)
MCHC RBC AUTO-ENTMCNC: 30.7 G/DL (ref 31.5–36.5)
MCV RBC AUTO: 95 FL (ref 78–100)
MONOCYTES # BLD AUTO: 0.4 10E3/UL (ref 0–1.3)
MONOCYTES NFR BLD AUTO: 7 %
NEUTROPHILS # BLD AUTO: 4.1 10E3/UL (ref 1.6–8.3)
NEUTROPHILS NFR BLD AUTO: 63 %
NRBC # BLD AUTO: 0 10E3/UL
NRBC BLD AUTO-RTO: 0 /100
PLATELET # BLD AUTO: 363 10E3/UL (ref 150–450)
RBC # BLD AUTO: 4.17 10E6/UL (ref 3.8–5.2)
WBC # BLD AUTO: 6.6 10E3/UL (ref 4–11)

## 2024-10-23 PROCEDURE — 85025 COMPLETE CBC W/AUTO DIFF WBC: CPT | Mod: ORL | Performed by: STUDENT IN AN ORGANIZED HEALTH CARE EDUCATION/TRAINING PROGRAM

## 2024-10-23 PROCEDURE — 84439 ASSAY OF FREE THYROXINE: CPT | Mod: ORL | Performed by: STUDENT IN AN ORGANIZED HEALTH CARE EDUCATION/TRAINING PROGRAM

## 2024-10-23 PROCEDURE — 84443 ASSAY THYROID STIM HORMONE: CPT | Mod: ORL | Performed by: STUDENT IN AN ORGANIZED HEALTH CARE EDUCATION/TRAINING PROGRAM

## 2024-10-24 ENCOUNTER — DOCUMENTATION ONLY (OUTPATIENT)
Facility: CLINIC | Age: 34
End: 2024-10-24
Payer: COMMERCIAL

## 2024-10-24 LAB
T4 FREE SERPL-MCNC: 1.37 NG/DL (ref 0.9–1.7)
TSH SERPL DL<=0.005 MIU/L-ACNC: 0.22 UIU/ML (ref 0.3–4.2)

## 2024-10-26 ENCOUNTER — LACTATION ENCOUNTER (OUTPATIENT)
Dept: OTHER | Facility: CLINIC | Age: 34
End: 2024-10-26

## 2024-10-26 NOTE — LACTATION NOTE
This note was copied from a baby's chart.  Lactation visit with Jessica, mom, in NICU. She's noticed a white bleb on the surface of her left nipple that developed yesterday, then resolved with a warm compress, however, another smaller one appeared overnight. She's using hospital grade Medela pump, both at home and in hospital, and also using Unimom pump once per day. She reports turning the suction up to alf of maximum level. Discussed decreasing the suction level when pumping as long as she continues to get milk sprays from both sides, to decrease potential nipple inflammation and trauma. Jessica reports she had nipples sized for correct breast shield size and feels fit is correct. OK to continue with clean, warm compress prior to pumping, also encouraged cold packs to nipple to decrease inflammation. Reviewed breast infection signs and symptoms for reportable symptoms to OB provider.     We also discussed milk volume and Jessica is getting 750+ ml per day at this point, which is above target volume. Offered support and encouragement and recommend Jessica continue to pump on current schedule. Lactation will continue to be available as needed and can revisit. Jessica appreciative of visit and support.    Olivia Burns, RN, BSN, MNN, IBCLC

## 2024-11-05 ENCOUNTER — LACTATION ENCOUNTER (OUTPATIENT)
Dept: OTHER | Facility: CLINIC | Age: 34
End: 2024-11-05

## 2024-11-05 NOTE — LACTATION NOTE
This note was copied from a baby's chart.  Lactation visit with Jessica, mom, Humberto, dad, & baby Lexus in NICU. Lexus has been working on IDF and starting to breastfeed. She hasn't been transferring significant volumes yet at breast or via bottle but is working on skill development for feedings. Jessica has a great milk supply and is getting about 800 ml average per day, pumping 7 times in 24 hours.     At time of visit she was starting to work on feeding with Lexus at breast. We worked on positioning in football hold and Lexus was able to latch and took a few strong suck bursts. A few swallows heard, but then Lexus stopped actively sucking. She stayed latched at breast with occasional weak sucks. We discussed what to expect with development of feeding skills. Reviewed as Lexus gains strength and stamina, we'd expect her to be able to coordinate suck, swallow, breathe and feed for longer periods of time. Encouraged continuing to work on feeding as Lexus shows interest so she can work on feeding skills. Let Jessica know she's doing a great job with handling Lexus, and with positioning Lexus at breast. We reviewed breastfeeding basics during our visit. Jessica very appreciative of visit and Lactation support. Will revisit as needed.    Olivia Burns, RN, BSN, MNN, IBCLC

## 2024-12-06 ENCOUNTER — LAB REQUISITION (OUTPATIENT)
Dept: LAB | Facility: CLINIC | Age: 34
End: 2024-12-06
Payer: COMMERCIAL

## 2024-12-06 DIAGNOSIS — E03.9 HYPOTHYROIDISM, UNSPECIFIED: ICD-10-CM

## 2024-12-06 DIAGNOSIS — N89.8 OTHER SPECIFIED NONINFLAMMATORY DISORDERS OF VAGINA: ICD-10-CM

## 2024-12-06 LAB
ERYTHROCYTE [DISTWIDTH] IN BLOOD BY AUTOMATED COUNT: 12 % (ref 10–15)
HCT VFR BLD AUTO: 43.7 % (ref 35–47)
HGB BLD-MCNC: 14 G/DL (ref 11.7–15.7)
MCH RBC QN AUTO: 28.8 PG (ref 26.5–33)
MCHC RBC AUTO-ENTMCNC: 32 G/DL (ref 31.5–36.5)
MCV RBC AUTO: 90 FL (ref 78–100)
PLATELET # BLD AUTO: 273 10E3/UL (ref 150–450)
RBC # BLD AUTO: 4.86 10E6/UL (ref 3.8–5.2)
WBC # BLD AUTO: 7 10E3/UL (ref 4–11)

## 2024-12-06 PROCEDURE — 84443 ASSAY THYROID STIM HORMONE: CPT | Mod: ORL | Performed by: STUDENT IN AN ORGANIZED HEALTH CARE EDUCATION/TRAINING PROGRAM

## 2024-12-06 PROCEDURE — 85027 COMPLETE CBC AUTOMATED: CPT | Mod: ORL | Performed by: STUDENT IN AN ORGANIZED HEALTH CARE EDUCATION/TRAINING PROGRAM

## 2024-12-07 LAB — TSH SERPL DL<=0.005 MIU/L-ACNC: 2.25 UIU/ML (ref 0.3–4.2)

## 2024-12-15 ENCOUNTER — HEALTH MAINTENANCE LETTER (OUTPATIENT)
Age: 34
End: 2024-12-15

## 2024-12-23 ENCOUNTER — TRANSCRIBE ORDERS (OUTPATIENT)
Dept: OTHER | Age: 34
End: 2024-12-23

## 2024-12-23 DIAGNOSIS — G43.109 MIGRAINE WITH AURA, NOT INTRACTABLE, WITHOUT STATUS MIGRAINOSUS: Primary | ICD-10-CM

## 2025-01-30 NOTE — CONFIDENTIAL NOTE
Reason for visit: Migraine with aura, not intractable, without status migrainosus    Referring Provider: Marisol Hennessy DO [facility]    Office Visit Notes: 12/6/2024     Additional notes: 01/07/2025   Lili Cade MD   [facility]        All IMAGING   STATUS/LOCATION   DATE   MRI/MRA N/A     CT/CTA External  [facility]  Requested 07/19/2021    LABS Internal    EEG N/A    EMG N/A    NEUOROPSYCH   TEST: N/A

## 2025-03-26 ENCOUNTER — PRE VISIT (OUTPATIENT)
Dept: NEUROLOGY | Facility: CLINIC | Age: 35
End: 2025-03-26

## 2025-05-08 ENCOUNTER — LAB REQUISITION (OUTPATIENT)
Dept: LAB | Facility: CLINIC | Age: 35
End: 2025-05-08
Payer: COMMERCIAL

## 2025-05-08 DIAGNOSIS — R39.9 UNSPECIFIED SYMPTOMS AND SIGNS INVOLVING THE GENITOURINARY SYSTEM: ICD-10-CM

## 2025-05-08 PROCEDURE — 87086 URINE CULTURE/COLONY COUNT: CPT | Mod: ORL

## 2025-05-10 LAB — BACTERIA UR CULT: NO GROWTH

## 2025-08-12 ENCOUNTER — LAB REQUISITION (OUTPATIENT)
Dept: LAB | Facility: CLINIC | Age: 35
End: 2025-08-12
Payer: COMMERCIAL

## 2025-08-12 ENCOUNTER — TRANSFERRED RECORDS (OUTPATIENT)
Dept: HEALTH INFORMATION MANAGEMENT | Facility: CLINIC | Age: 35
End: 2025-08-12

## 2025-08-12 DIAGNOSIS — E03.9 HYPOTHYROIDISM, UNSPECIFIED: ICD-10-CM

## 2025-08-13 ENCOUNTER — MEDICAL CORRESPONDENCE (OUTPATIENT)
Dept: HEALTH INFORMATION MANAGEMENT | Facility: CLINIC | Age: 35
End: 2025-08-13
Payer: COMMERCIAL

## 2025-08-13 ENCOUNTER — TRANSCRIBE ORDERS (OUTPATIENT)
Dept: OTHER | Age: 35
End: 2025-08-13

## 2025-08-13 DIAGNOSIS — K59.09 OTHER CONSTIPATION: Primary | ICD-10-CM

## 2025-08-13 LAB
T4 FREE SERPL-MCNC: 1.03 NG/DL (ref 0.9–1.7)
TSH SERPL DL<=0.005 MIU/L-ACNC: 2.8 UIU/ML (ref 0.3–4.2)

## 2025-08-25 ENCOUNTER — VIRTUAL VISIT (OUTPATIENT)
Dept: GASTROENTEROLOGY | Facility: CLINIC | Age: 35
End: 2025-08-25
Attending: STUDENT IN AN ORGANIZED HEALTH CARE EDUCATION/TRAINING PROGRAM
Payer: COMMERCIAL

## 2025-08-25 ENCOUNTER — MEDICAL CORRESPONDENCE (OUTPATIENT)
Dept: HEALTH INFORMATION MANAGEMENT | Facility: CLINIC | Age: 35
End: 2025-08-25

## 2025-08-25 ENCOUNTER — DOCUMENTATION ONLY (OUTPATIENT)
Dept: GASTROENTEROLOGY | Facility: CLINIC | Age: 35
End: 2025-08-25

## 2025-08-25 DIAGNOSIS — K59.00 CONSTIPATION, UNSPECIFIED CONSTIPATION TYPE: Primary | ICD-10-CM

## 2025-08-25 DIAGNOSIS — K59.09 OTHER CONSTIPATION: ICD-10-CM

## 2025-08-25 PROCEDURE — 98002 SYNCH AUDIO-VIDEO NEW MOD 45: CPT | Performed by: PHYSICIAN ASSISTANT

## (undated) DEVICE — SU VICRYL 4-0 PS-2 18" UND J496H

## (undated) DEVICE — PACK TVT HYSTEROSCOPY SMA15HYFSE

## (undated) DEVICE — DECANTER BAG 2002S

## (undated) DEVICE — CATH TRAY FOLEY SURESTEP 16FR W/URNE MTR STLK LATEX A303316A

## (undated) DEVICE — SOL NACL 0.9% IRRIG 1000ML BOTTLE 2F7124

## (undated) DEVICE — GLOVE BIOGEL PI MICRO SZ 7.5 48575

## (undated) DEVICE — SPONGE RAY-TEC 4X8" 7318

## (undated) DEVICE — SYR BULB IRRIG DOVER 60 ML LATEX FREE 67000

## (undated) DEVICE — SOL WATER IRRIG 1000ML BOTTLE 2F7114

## (undated) DEVICE — LINEN TOWEL PACK X5 5464

## (undated) DEVICE — TUBING VACUUM COLLECTION 6FT 23116

## (undated) RX ORDER — ONDANSETRON 2 MG/ML
INJECTION INTRAMUSCULAR; INTRAVENOUS
Status: DISPENSED
Start: 2024-10-07

## (undated) RX ORDER — FENTANYL CITRATE 50 UG/ML
INJECTION, SOLUTION INTRAMUSCULAR; INTRAVENOUS
Status: DISPENSED
Start: 2024-10-08

## (undated) RX ORDER — MEPERIDINE HYDROCHLORIDE 25 MG/ML
INJECTION INTRAMUSCULAR; INTRAVENOUS; SUBCUTANEOUS
Status: DISPENSED
Start: 2024-10-08